# Patient Record
Sex: FEMALE | Race: BLACK OR AFRICAN AMERICAN | NOT HISPANIC OR LATINO | Employment: UNEMPLOYED | URBAN - METROPOLITAN AREA
[De-identification: names, ages, dates, MRNs, and addresses within clinical notes are randomized per-mention and may not be internally consistent; named-entity substitution may affect disease eponyms.]

---

## 2017-11-10 ENCOUNTER — HISTORICAL (OUTPATIENT)
Dept: RADIOLOGY | Facility: HOSPITAL | Age: 22
End: 2017-11-10

## 2018-10-26 ENCOUNTER — HISTORICAL (OUTPATIENT)
Dept: ADMINISTRATIVE | Facility: HOSPITAL | Age: 23
End: 2018-10-26

## 2019-02-21 ENCOUNTER — HISTORICAL (OUTPATIENT)
Dept: ADMINISTRATIVE | Facility: HOSPITAL | Age: 24
End: 2019-02-21

## 2019-02-23 LAB — FINAL CULTURE: NORMAL

## 2021-10-31 ENCOUNTER — HOSPITAL ENCOUNTER (OUTPATIENT)
Dept: MEDSURG UNIT | Facility: HOSPITAL | Age: 26
End: 2021-11-02
Attending: INTERNAL MEDICINE | Admitting: INTERNAL MEDICINE

## 2021-10-31 LAB
ABS NEUT (OLG): 5.25 X10(3)/MCL (ref 2.1–9.2)
ALBUMIN SERPL-MCNC: 4.5 GM/DL (ref 3.5–5)
ALBUMIN/GLOB SERPL: 1.2 RATIO (ref 1.1–2)
ALP SERPL-CCNC: 48 UNIT/L (ref 40–150)
ALT SERPL-CCNC: 23 UNIT/L (ref 0–55)
AMPHET UR QL SCN: NEGATIVE
APPEARANCE, UA: CLEAR
AST SERPL-CCNC: 25 UNIT/L (ref 5–34)
BACTERIA #/AREA URNS AUTO: ABNORMAL /HPF
BARBITURATE SCN PRESENT UR: NEGATIVE
BASOPHILS # BLD AUTO: 0 X10(3)/MCL (ref 0–0.2)
BASOPHILS NFR BLD AUTO: 0 %
BENZODIAZ UR QL SCN: POSITIVE
BILIRUB SERPL-MCNC: 0.2 MG/DL
BILIRUB UR QL STRIP: NEGATIVE
BILIRUBIN DIRECT+TOT PNL SERPL-MCNC: 0.1 MG/DL (ref 0–0.5)
BILIRUBIN DIRECT+TOT PNL SERPL-MCNC: 0.1 MG/DL (ref 0–0.8)
BUN SERPL-MCNC: 7.7 MG/DL (ref 7–18.7)
CALCIUM SERPL-MCNC: 10.4 MG/DL (ref 8.7–10.5)
CANNABINOIDS UR QL SCN: NEGATIVE
CHLORIDE SERPL-SCNC: 106 MMOL/L (ref 98–107)
CK MB SERPL-MCNC: 1.9 NG/ML
CK SERPL-CCNC: 507 U/L (ref 29–168)
CO2 SERPL-SCNC: 25 MMOL/L (ref 22–29)
COCAINE UR QL SCN: NEGATIVE
COLOR UR: COLORLESS
CREAT SERPL-MCNC: 0.75 MG/DL (ref 0.55–1.02)
ERYTHROCYTE [DISTWIDTH] IN BLOOD BY AUTOMATED COUNT: 12 % (ref 11.5–14.5)
ETHANOL SERPL-MCNC: 192 MG/DL
GLOBULIN SER-MCNC: 3.9 GM/DL (ref 2.4–3.5)
GLUCOSE (UA): NEGATIVE
GLUCOSE SERPL-MCNC: 88 MG/DL (ref 74–100)
HAV IGM SERPL QL IA: NONREACTIVE
HBV CORE IGM SERPL QL IA: NONREACTIVE
HBV SURFACE AG SERPL QL IA: NONREACTIVE
HCT VFR BLD AUTO: 38.4 % (ref 35–46)
HCV AB SERPL QL IA: NONREACTIVE
HGB BLD-MCNC: 12.5 GM/DL (ref 12–16)
HGB UR QL STRIP: 0.1
HIV 1+2 AB+HIV1 P24 AG SERPL QL IA: NONREACTIVE
HYALINE CASTS #/AREA URNS LPF: ABNORMAL /LPF
IMM GRANULOCYTES # BLD AUTO: 0.02 10*3/UL
IMM GRANULOCYTES NFR BLD AUTO: 0 %
KETONES UR QL STRIP: NEGATIVE
LEUKOCYTE ESTERASE UR QL STRIP: NEGATIVE
LYMPHOCYTES # BLD AUTO: 1.7 X10(3)/MCL (ref 0.6–4.6)
LYMPHOCYTES NFR BLD AUTO: 23 %
MAGNESIUM SERPL-MCNC: 1.9 MG/DL (ref 1.6–2.6)
MCH RBC QN AUTO: 33.6 PG (ref 26–34)
MCHC RBC AUTO-ENTMCNC: 32.6 GM/DL (ref 31–37)
MCV RBC AUTO: 103.2 FL (ref 80–100)
MONOCYTES # BLD AUTO: 0.6 X10(3)/MCL (ref 0.1–1.3)
MONOCYTES NFR BLD AUTO: 7 %
NEUTROPHILS # BLD AUTO: 5.25 X10(3)/MCL (ref 2.1–9.2)
NEUTROPHILS NFR BLD AUTO: 69 %
NITRITE UR QL STRIP: NEGATIVE
NRBC BLD AUTO-RTO: 0 % (ref 0–0.2)
OPIATES UR QL SCN: NEGATIVE
PCP UR QL: NEGATIVE
PH UR STRIP.AUTO: 6 [PH] (ref 3–11)
PH UR STRIP: 6 [PH] (ref 4.5–8)
PLATELET # BLD AUTO: 253 X10(3)/MCL (ref 130–400)
PMV BLD AUTO: 10 FL (ref 7.4–10.4)
POC BETA-HCG (QUAL): NEGATIVE
POTASSIUM SERPL-SCNC: 3.4 MMOL/L (ref 3.5–5.1)
PROT SERPL-MCNC: 8.4 GM/DL (ref 6.4–8.3)
PROT UR QL STRIP: 70 MG/DL
RBC # BLD AUTO: 3.72 X10(6)/MCL (ref 4–5.2)
RBC #/AREA URNS AUTO: ABNORMAL /HPF
SARS-COV-2 AG RESP QL IA.RAPID: NEGATIVE
SODIUM SERPL-SCNC: 142 MMOL/L (ref 136–145)
SP GR UR STRIP: 1 (ref 1–1.03)
SQUAMOUS #/AREA URNS LPF: ABNORMAL /LPF
T PALLIDUM AB SER QL: NONREACTIVE
T4 FREE SERPL-MCNC: 0.85 NG/DL (ref 0.7–1.48)
TROPONIN I SERPL-MCNC: 0.01 NG/ML (ref 0–0.04)
TSH SERPL-ACNC: 1.62 UIU/ML (ref 0.35–4.94)
UROBILINOGEN UR STRIP-ACNC: NORMAL
WBC # SPEC AUTO: 7.6 X10(3)/MCL (ref 4.5–11)
WBC #/AREA URNS AUTO: ABNORMAL /HPF

## 2021-11-01 LAB
ABS NEUT (OLG): 2.27 X10(3)/MCL (ref 2.1–9.2)
ALBUMIN SERPL-MCNC: 3.6 GM/DL (ref 3.5–5)
ALBUMIN/GLOB SERPL: 1.1 RATIO (ref 1.1–2)
ALP SERPL-CCNC: 39 UNIT/L (ref 40–150)
ALT SERPL-CCNC: 21 UNIT/L (ref 0–55)
AST SERPL-CCNC: 26 UNIT/L (ref 5–34)
BASOPHILS # BLD AUTO: 0 X10(3)/MCL (ref 0–0.2)
BASOPHILS NFR BLD AUTO: 0 %
BILIRUB SERPL-MCNC: 0.4 MG/DL
BILIRUBIN DIRECT+TOT PNL SERPL-MCNC: 0.2 MG/DL (ref 0–0.5)
BILIRUBIN DIRECT+TOT PNL SERPL-MCNC: 0.2 MG/DL (ref 0–0.8)
BUN SERPL-MCNC: 6 MG/DL (ref 7–18.7)
CALCIUM SERPL-MCNC: 9.2 MG/DL (ref 8.7–10.5)
CHLORIDE SERPL-SCNC: 105 MMOL/L (ref 98–107)
CO2 SERPL-SCNC: 24 MMOL/L (ref 22–29)
CREAT SERPL-MCNC: 0.54 MG/DL (ref 0.55–1.02)
ERYTHROCYTE [DISTWIDTH] IN BLOOD BY AUTOMATED COUNT: 12.1 % (ref 11.5–14.5)
FOLATE SERPL-MCNC: 8.7 NG/ML (ref 7–31.4)
GLOBULIN SER-MCNC: 3.3 GM/DL (ref 2.4–3.5)
GLUCOSE SERPL-MCNC: 80 MG/DL (ref 74–100)
HCT VFR BLD AUTO: 33.4 % (ref 35–46)
HGB BLD-MCNC: 10.9 GM/DL (ref 12–16)
IMM GRANULOCYTES # BLD AUTO: 0.01 10*3/UL
IMM GRANULOCYTES NFR BLD AUTO: 0 %
LYMPHOCYTES # BLD AUTO: 1.7 X10(3)/MCL (ref 0.6–4.6)
LYMPHOCYTES NFR BLD AUTO: 37 %
MAGNESIUM SERPL-MCNC: 1.9 MG/DL (ref 1.6–2.6)
MCH RBC QN AUTO: 33.7 PG (ref 26–34)
MCHC RBC AUTO-ENTMCNC: 32.6 GM/DL (ref 31–37)
MCV RBC AUTO: 103.4 FL (ref 80–100)
MONOCYTES # BLD AUTO: 0.6 X10(3)/MCL (ref 0.1–1.3)
MONOCYTES NFR BLD AUTO: 13 %
NEUTROPHILS # BLD AUTO: 2.27 X10(3)/MCL (ref 2.1–9.2)
NEUTROPHILS NFR BLD AUTO: 50 %
NRBC BLD AUTO-RTO: 0 % (ref 0–0.2)
PHOSPHATE SERPL-MCNC: 2.7 MG/DL (ref 2.3–4.7)
PLATELET # BLD AUTO: 205 X10(3)/MCL (ref 130–400)
PMV BLD AUTO: 10.2 FL (ref 7.4–10.4)
POTASSIUM SERPL-SCNC: 3.4 MMOL/L (ref 3.5–5.1)
PROT SERPL-MCNC: 6.9 GM/DL (ref 6.4–8.3)
RBC # BLD AUTO: 3.23 X10(6)/MCL (ref 4–5.2)
SODIUM SERPL-SCNC: 137 MMOL/L (ref 136–145)
VIT B12 SERPL-MCNC: 535 PG/ML (ref 213–816)
WBC # SPEC AUTO: 4.6 X10(3)/MCL (ref 4.5–11)

## 2021-11-02 LAB
ABS NEUT (OLG): 2.13 X10(3)/MCL (ref 2.1–9.2)
ALBUMIN SERPL-MCNC: 3.9 GM/DL (ref 3.5–5)
ALBUMIN/GLOB SERPL: 1 RATIO (ref 1.1–2)
ALP SERPL-CCNC: 45 UNIT/L (ref 40–150)
ALT SERPL-CCNC: 21 UNIT/L (ref 0–55)
AST SERPL-CCNC: 23 UNIT/L (ref 5–34)
BASOPHILS # BLD AUTO: 0 X10(3)/MCL (ref 0–0.2)
BASOPHILS NFR BLD AUTO: 0 %
BILIRUB SERPL-MCNC: 0.3 MG/DL
BILIRUBIN DIRECT+TOT PNL SERPL-MCNC: 0.1 MG/DL (ref 0–0.8)
BILIRUBIN DIRECT+TOT PNL SERPL-MCNC: 0.2 MG/DL (ref 0–0.5)
BUN SERPL-MCNC: 6.9 MG/DL (ref 7–18.7)
CALCIUM SERPL-MCNC: 9.9 MG/DL (ref 8.7–10.5)
CHLORIDE SERPL-SCNC: 105 MMOL/L (ref 98–107)
CO2 SERPL-SCNC: 22 MMOL/L (ref 22–29)
CREAT SERPL-MCNC: 0.71 MG/DL (ref 0.55–1.02)
EOSINOPHIL # BLD AUTO: 0 X10(3)/MCL (ref 0–0.9)
EOSINOPHIL NFR BLD AUTO: 0 %
ERYTHROCYTE [DISTWIDTH] IN BLOOD BY AUTOMATED COUNT: 11.8 % (ref 11.5–14.5)
GLOBULIN SER-MCNC: 3.9 GM/DL (ref 2.4–3.5)
GLUCOSE SERPL-MCNC: 104 MG/DL (ref 74–100)
HCT VFR BLD AUTO: 35.9 % (ref 35–46)
HGB BLD-MCNC: 12 GM/DL (ref 12–16)
IMM GRANULOCYTES # BLD AUTO: 0.01 10*3/UL
IMM GRANULOCYTES NFR BLD AUTO: 0 %
LYMPHOCYTES # BLD AUTO: 1.8 X10(3)/MCL (ref 0.6–4.6)
LYMPHOCYTES NFR BLD AUTO: 38 %
MAGNESIUM SERPL-MCNC: 2.1 MG/DL (ref 1.6–2.6)
MCH RBC QN AUTO: 33.4 PG (ref 26–34)
MCHC RBC AUTO-ENTMCNC: 33.4 GM/DL (ref 31–37)
MCV RBC AUTO: 100 FL (ref 80–100)
MONOCYTES # BLD AUTO: 0.6 X10(3)/MCL (ref 0.1–1.3)
MONOCYTES NFR BLD AUTO: 14 %
NEUTROPHILS # BLD AUTO: 2.13 X10(3)/MCL (ref 2.1–9.2)
NEUTROPHILS NFR BLD AUTO: 47 %
NRBC BLD AUTO-RTO: 0 % (ref 0–0.2)
PHOSPHATE SERPL-MCNC: 2.7 MG/DL (ref 2.3–4.7)
PLATELET # BLD AUTO: 234 X10(3)/MCL (ref 130–400)
PMV BLD AUTO: 10.4 FL (ref 7.4–10.4)
POTASSIUM SERPL-SCNC: 3.8 MMOL/L (ref 3.5–5.1)
PROT SERPL-MCNC: 7.8 GM/DL (ref 6.4–8.3)
RBC # BLD AUTO: 3.59 X10(6)/MCL (ref 4–5.2)
SODIUM SERPL-SCNC: 136 MMOL/L (ref 136–145)
WBC # SPEC AUTO: 4.5 X10(3)/MCL (ref 4.5–11)

## 2022-03-20 ENCOUNTER — HISTORICAL (OUTPATIENT)
Dept: ADMINISTRATIVE | Facility: HOSPITAL | Age: 27
End: 2022-03-20

## 2022-04-10 ENCOUNTER — HISTORICAL (OUTPATIENT)
Dept: ADMINISTRATIVE | Facility: HOSPITAL | Age: 27
End: 2022-04-10
Payer: MEDICAID

## 2022-04-29 VITALS
DIASTOLIC BLOOD PRESSURE: 87 MMHG | BODY MASS INDEX: 26.61 KG/M2 | SYSTOLIC BLOOD PRESSURE: 137 MMHG | HEIGHT: 67 IN | WEIGHT: 169.56 LBS

## 2022-05-02 NOTE — HISTORICAL [facility] CERNER
This is a historical note converted from [employer]. Formatting and pictures may have been removed.  Please reference [employer]ner for original formatting and attached multimedia. Admit and Discharge Dates  Admit Date: 10/31/2021  Discharge Date: 11/02/2021  Physicians  Attending Physician - Andrew PHILLIPS, Stewart  Admitting Physician - Son PHILLIPS, Berry SUGGS  Consulting Physician - Kam PHILLIPS, Winnie MITCHELL  Primary Care Physician - Rhonda PHILLIPS , Isabel BENNETT  Discharge Diagnosis  Altered mental status?[id number]  Muscle spasms of both upper and lower extremities?M62.838  Pseudoseizures?R56.9  Admission Information  This is a 26 year old  female with a past medical history of hyperthyroidism, anxiety, hair loss, and unspecified heart valve disease per mother who presented to [facility] ED on 10/31/21 for seizures. Patient states that she was at a party sitting down?when she laid her head on family member then?had sudden onset of generalized spasms of upper and lower extremities with grinding of the teeth. States that she recalls feeling a sensation of a rush of air through both ears and did not lose consciousness. No tongue biting, no incontinence.?Event was witnessed by family members.?Patients mother at bedside states that she has had prior episodes of seizure like activity in May 2021 and was admitted at [facility]; EEG was done during this admission but she was not started on anti epileptic medications at discharge. She had no further episodes of seizures until the night of consult. In the ambulance, she again had witnessed seizure activity and was given 5 mg Versed. On arrival to ED, patient had 2 further episodes of tonic clonic spasms with grinding of teeth; was given 2g of Ativan after each episode for a total of 4g, thus prompting consult to Internal Medicine. At the time of examination, patient again had seizure episode with generalized tonic clonic spasms of both upper and lower extremities and teeth  grinding. Also complained of chest pain at the time of this episode. She was?started on Keppra and admitted for observation.  Hospital Course  This is a 26 year old  female with a past medical history of hyperthyroidism, anxiety, hair loss, and unspecified heart valve disease per mother who presented to [facility] ED on 10/31/21 for seizures. On arrival to ED, patient had 2 further episodes of tonic clonic spasms with grinding of teeth; was given 2g of Ativan after each episode for a total of 4g, thus prompting consult to Internal Medicine. She was?started on Keppra and admitted for observation. On hospital day 1 patients potassium was repleted.? An echocardiogram was ordered due to a unspecified valvular heart disease.? TFTs were ordered given history of hyperthyroidism.? Patient was put on seizure precautions, made n.p.o. and scheduled for acute 2-hour neurochecks.? Patient was started on Keppra 1000 twice daily for loading followed by 500 twice daily daily.? Patient was prescribed Ativan to be given for seizure-like activity.? Neurology records were requested from [facility].? On hospital day 2 patient had seizure-like activity overnight which resolved after 1 dose of Ativan.? Patients Keppra was increased to 750 mg twice daily.? Patient had further one episode of seizure-like activity during the day which resolved after 1 mg of Ativan.? Video was obtained which was seen by neurology.? Neurology stated that seizure-like activity was psychogenic in nature and not a true seizure.? Patients Keppra was decreased back to to 500 mg twice a day and patient was started on Lamictal 50 mg daily.? On hospital day 3 patient had no further episodes of seizure-like activity.? Patients TFTs were normal.? Keppra was discontinued per neurologys recommendations and patient was discharged home on Lamictal 50 mg daily.? Patient was scheduled for post wards follow-up and given a referral to neurology as patient does not have  outside neurologist [facility].  Significant Findings  Pseudoseizures  Time Spent on discharge  Greater than 35 mins  Objective  Vitals & Measurements  T:?36.9? ?C (Oral)? TMIN:?36.6? ?C (Oral)? TMAX:?37? ?C (Oral)? HR:?77(Peripheral)? RR:?20? BP:?133/82? SpO2:?97%? WT:?84.6?kg?  Physical Exam  See progress note  Patient Discharge Condition  Patient is clinically and hemodynamically stable  Discharge Disposition  Discharge home  Rx Lamictal 50mg daily  Discontinued Keppra 500mg BID per neurology recommendations  Follow up with [facility] on 11/22/21 with [facility]  Follow up with [facility] at next available appointment  Return to the ED if symptoms worsen or return, patient states verbal understanding and agrees   Discharge Medication Reconciliation  Prescribed  lamoTRIgine (LaMICtal 25 mg oral tablet)?50 mg, Oral, Daily  Continue  methIMAzole (methimazole 10 mg oral tablet)?10 mg, Oral, TID  propranolol (propranolol 10 mg oral tablet)?10 mg, Oral, TID  Discontinue  propranolol (propranolol 20 mg oral tablet)?20 mg, Oral, TID  Education and Orders Provided  Seizure, Adult, Easy-to-Read  Living With Anxiety  Discharge - 11/02/21 14:21:00 CDT, Home, please do not discharge until meds to bed is complete?  Follow up  Report to Emergency Department if symptoms return or worsen  Follow up with [facility] on 11/22/21  Follow up with [facility] Neurology clinic at next available appointment

## 2022-05-02 NOTE — HISTORICAL [facility] CERNER
This is a historical note converted from Cerner. Formatting and pictures may have been removed.  Please reference Cerner for original formatting and attached multimedia. Chief Complaint  To ED per EMS. ?States witnesed seizure per Parents. ?Parents state not normal seizure. ?States was grinding teeth and very stiff. ?Same witnessed upon arrival to ED 12.  History of Present Illness  This is a 26 year old  female with a past medical history of hyperthyroidism, anxiety, hair loss, and unspecified heart valve disease per mother who presented to [facility] ED on 10/31/21 for seizures. Patient states that she was at a party sitting down?when she laid her head on family member then?had sudden onset of generalized spasms of upper and lower extremities with grinding of the teeth. States that she recalls feeling a sensation of a rush of air through both ears and did not lose consciousness. No tongue biting, no incontinence.?Event was witnessed by family members.?Patients mother at bedside states that she has had prior episodes of seizure like activity in May 2021 and was admitted at [facility]; EEG was done during this admission but she was not started on anti epileptic medications at discharge. She had no further episodes of seizures until the night of consult. In the ambulance, she again had witnessed seizure activity and was given 5 mg Versed. On arrival to ED, patient had 2 further episodes of tonic clonic spasms with grinding of teeth; was given 2g of Ativan after each episode for a total of 4g, thus prompting consult to Internal Medicine. At the time of examination, patient again had seizure episode with generalized tonic clonic spasms of both upper and lower extremities and teeth grinding. Also complained of chest pain at the time of this episode. She was?started on Keppra and admitted for observation  ?  Past Medical History: Hyperthyroidism/ hypothyroidism; states that she is supposed to take methimazole and  propanolol but stopped taking Methimazole recently, stating that it causes her to have hallucinations; currently taking thyroid supplements over the counter from [employer]; anxiety, hair loss,?unspecified heart valve disease  Past Surgical History: lap cholecystectomy  Social History: nonsmoker, states that she occasionally has 1 alcoholic beverage, no drug use  Review of Systems  10 point review of systems unremarkable except as listed above  Physical Exam  Vitals & Measurements  T:?36.0? ?C (Temporal Artery)? HR:?107(Peripheral)? RR:?18? BP:?132/90? SpO2:?98%?  General:?well-developed well-nourished in no acute distress  Eye: no scleral icterus, EOMI  HENT:?normocephalic, atraumatic  Neck: full range of motion, no thyromegaly or lymphadenopathy  Respiratory:?clear to auscultation bilaterally  Cardiovascular:?regular rate and rhythm without murmurs, gallops or rubs; no peripheral edema  Gastrointestinal:?soft, mildly tender epigastric region, non-distended with normal bowel sounds, without masses to palpation  Genitourinary: no CVA tenderness to palpation  Musculoskeletal:?full range of motion of all extremities/spine without limitation or discomfort  Integumentary: no rashes or skin lesions present  Neurologic:?  ?   Neuro Exam: ?  MENTAL STATUS: AAOx3, tremulous,?teeth grinding and jerking movements of hands during exam  ? LANG/SPEECH: Fluent,?  ? CRANIAL NERVES:  ? II: Pupils equal and reactive, no RAPD, normal visual field and fundus  ? III, IV, VI: EOM intact, no gaze preference or deviation  ? V: normal  ? VII: no facial asymmetry  ? VIII: normal hearing to speech  ? MOTOR:?4/5 in?right upper and lower extremity; 5/5 in left upper and lower extremity  did not complete cerebellar/ reflex testing due to onset of seizure activity during exam  Assessment/Plan  Seizure disorder  -Admitted to remote telemetry for observation for seizure activity  -NPO  -UDS positive for benzodiazpines  -Consult case management to  obtain outside records for previous hospitalization in [facility] for seizure like activity  -Started on keppra IV and Ativan q2 prn seizures  ?   Hypokalemia  -Potassium of 3.4 on admission  -ordered 20 mEq KCl  ?   Hyperthyroidism  -holding thyroid medications while NPO, patient not currently taking methimazole as prescribed  ?   Unspecified valvular heart disease  -states that she has was diagnosed with unrecalled valve disease  -Ordered echocardiogram  -complained of chest pain during seizure episode in ED, ordered troponin, CK, CK-MB  ?   DVT Proph: Lovenox  GI proph: Protonix  IVF: none  Abx none  Diet: NPO  ?  PGY 2 addendum:  ?  Agree with above documentation  ?  Patient is a 26-year-old -American female with PMH of anxiety, hemorrhoids, unspecified valvular heart disease (follows outside cardiologist every 6 months), thyroid disease (patient and mother states she alternates between hyper and hypothyroidism but no longer takes methimazole and instead takes OTC thyroid supplement from [employer]-follows with apparent endocrinologist in [place] however has not seen in some time due to unreliable transport), alopecia who presented to the ED after witnessed seizure-like activity while she was at a [email or web address] party and consuming alcohol.? States that she laid her head on a family member while sitting down and started convulsing with bilateral upper extremity spasms/contractions associated with teeth grinding, blurry vision, headache, nausea, and vague nonspecific chest pain.? Denies any tongue biting or loss of bowel/bladder function.? She does state that she was previously admitted to [facility] for a 1 week hospital course where they did an EEG which she states was unremarkable and she was not discharged on any antiepileptic medication.? We do not have access to these outside records.? Consulted case management for assistance.? She had another such seizure-like episode while in the CT scanner and when we  were evaluating her in the ED.  ?  In the ED she has remained tachycardic, afebrile, normotensive, on room air.? She has received 6 mg of Ativan in total, 5 mg of intranasal Versed, and 2 L of fluids.? Lab work significant for potassium 3.4, ethanol level 192, UDS positive for benzos.? CT head preliminary read with no acute intracranial process.? Small retention cyst or polyp is seen in the right maxillary sinus.? On exam she is well-developed well-nourished normocephalic atraumatic PERRLA EOMI MMM tachycardic regular rhythm CTAB no obvious MGR no edema normal peripheral pulses abdomen with mild epigastric tenderness no distention normal bowel sounds.? No FND however patient appears to have decreased right upper extremity strength and slightly reduced right  strength.? However she started having her seizure-like activity during neurological assessment therefore unable to do full assessment.  ?  Seizure disorder  -There is concern for pseudoseizure and muscle spasms  -N.p.o., seizure precautions, every 2 hour neurochecks  -Started Keppra 1000 mg IV twice daily; no previous antiepileptics prescribed  -Ativan as needed 1 g every 2 hours as needed for seizure-like activity  -Consulted case management to request records from [facility] admission pertaining to seizures and EEG performed  -Consider prolactin level at next seizure-like activity  -Consider EEG and/or MRI brain  -Ethanol level 192  ?  Hypokalemia  -Potassium 3.4  -Repleting with 20 mEq IV  ?  Unspecified valvular heart disease  -Ordered echocardiogram  -Mother and patient state that she follows outside cardiologist every 6 months  ?  Thyroid disorder  Alopecia  -Previous history of hyper thyroidism/goiter which went to hypothyroidism  -No longer on methimazole and instead takes OTC thyroid supplement from [employer]  -Pending TFTs  ?  ?  =======  Primary team addendum  =======  ?  Ms. Culver is a 26-year-old -American female with a past medical history  of hyperthyroidism, anxiety, trichotillomania, hemorrhoids,?and unspecified heart valve disease who presents to [facility] ED with her mother for seizures. ?Per the patient and mother, patient was attending a Halloween party yesterday evening and was feeling fine until she sat down when she started convulsing with bilateral upper extremity spasms and/or contractions with grinding of teeth and foaming at the mouth. ?Patient states that she has had episodes similar to this in the past and describes a prodromal feeling of being underwater or having a pounding in her ears before onset of seizure. ?Note, patient states that she is completely conscious during episodes and is without tongue biting or incontinence. ?  Patient endorses being hospitalized in May of this year at our [facility] for similar episode where a 24-hour EEG was performed by Dr. Benitez. ?Upon discharge patient was not prescribed any antiseizure medications.  ?  When examining patient with night team patient began to have what appeared to be a seizure with spasm/contraction of upper extremity with grinding of teeth and foaming at mouth. ?Patient was given 2 g of Ativan and admitted for observation.  ?  Past Medical History: Hyperthyroidism on methimazole and propranolol, anxiety, hair loss, unspecified heart valve disease  Past Surgical History: cholecystectomy  Social History: nonsmoker, states that she occasionally has 1 alcoholic beverage, no drug use  ?   Constitutional: No recent changes in?weight or appetite. No fevers, no chills, no?recent illness or sick contacts  Head:?No headache, no neck pain  Eyes: Blurry vision +, No double vision  Ears: No hearing loss, no tinnitus  Nose/mouth: No rhinorrhea, no congestion, no sore throat  Cardiac: Chest pain +, no palpitations, no lower extremity edema  Resp: No shortness of breath, no dyspnea on exertion,?no coughing  GI: No abdominal pain, no constipation, no diarrhea, no nausea, no emesis  Gu: No  dysuria, no hematuria, no incontinence  Musculoskeletal: No muscle weakness, no joint pain. Ambulatory without assistance at baseline  Neurologic: No loss of sensation, no dizziness,?no syncope  Skin: No rash, no jaundice, no sores  Psychiatric:?anxiety + , no SI/HI or depression  ?   Gen: Anxious appearing  female laying in bed in moderate distress  HEENT: Normocephalic, atraumatic.  Neck: No JVD or carotid bruits. No thyromegaly. No lymphadenopathy.  Heart: Tachycardic, no murmurs, gallops, clicks or rubs.  Lungs: CTAB without rales, wheezes or rhonchi. Normal work of breathing. Chest rise symmetrical on inspiration.  Abd: Soft, non-tender, non-distended and without guarding. No organomegaly. No obvious masses. Bowel sounds present.  Extremities: Radial and pedal pulses 2+ bilaterally, no LE edema.  MSK: No obvious deformities. Moves all extremities purposefully.  Neuro: Responds well to commands. alert to person, place, and time.  Skin: Warm, dry and without rashes.  ?  Seizure disorder  -Previous 24 EEG preformed at outside facility  -N.p.o., seizure precautions, every 2 hour neurochecks  -Started Keppra 1000 mg IV BID for loading, followed by 500BID starting tonight  -Ativan as needed 1 g every 2 hours as needed for seizure-like activity  -Consulted case management to request records from [facility] admission pertaining to seizures and EEG performed  -consulting neurology?pending records from?[facility]  -Consider prolactin level at next seizure-like activity  -Ethanol level 192  ?   Hypokalemia  -Potassium 3.4  -Repleting with 20 mEq IV  ?   Unspecified valvular heart disease  -Ordered echocardiogram  -Mother and patient state that she follows outside cardiologist every 6 months  ?   Thyroid disorder  Alopecia  -Previous history of hyper thyroidism/goiter which went to hypothyroidism  -No longer on methimazole and instead takes OTC thyroid supplement from [employer]  -Free T4 .85, TSH 1.61  - TRAB  and TSI pending  -Suspect?other autoimmune process?pending?lupus work-up  ?  Tachycardia  - Blood pressure sustaining in 120s  - On LR  ?  Disposition: Admit patient for observation.??N.p.o., seizure precautions?and neuro checks every 2 hours. ?Patient started on?1000 mg?of Keppra?twice daily?with?as needed Ativan?every 2 hours?for seizure-like activity. ?Will consider?consulting neurology. ?Pending?outside medical records from [facility]?pertaining to?seizure?history.? Pending echocardiogram for unspecified valvular heart disease.? Pending TRAB/TSI. Pending lupus workup  ?  Reji Crandall, DO  [employer] IM PGY 1  ?  Pt obs by on call team.? I saw the patient with the residents on rounds 10/31/21?and discussed the case, assisted with the development of the assessment and agree with the plan of care in as much as pt previously dx w/ pseudoseizures, reaadmitted with sz activity and tx with comorbid heavy etoh use by hx, hypokalemia, valvular heart disease by hx, hyperthyroidism s/p SIMS per mom likely Graves, alopecia s/p biopsy, tachycardia, proteinuria likely >500mg per chart review.? Needs neuro consult in the am, SLE workup.? Currently euthyroid per labs.? 50 minutes or more were spent in pt care.  Berry Cline MD, FACE   Problem List/Past Medical History  Ongoing  Obesity  Historical  Anxiety  biopsy  Hyperthyroidism  Hypothyroidism  Tachycardia  Procedure/Surgical History  Cholecystectomy Laparoscopic (.) (12/27/2016)  Laparoscopy, surgical; cholecystectomy (12/25/2016)  Resection of Gallbladder, Percutaneous Endoscopic Approach (12/25/2016)  Biopsy  Cholecystectomy  Extraction of wisdom tooth   Medications  Inpatient  Fluor-I-Strip-A.T., 1 mg= 1 EA, OPTH, Once  Keppra 500 mg/ mL PREMIX, 1000 mg= 200 mL, IV Piggyback, BID  ketorolac 30 mg for IV Push, 30 mg= 1 mL, IV Push, Once  Lactated Ringers 1000ml 1,000 mL, 1000 mL, IV  Lovenox, 40 mg= 0.4 mL, Subcutaneous, Daily  Norco 10 mg-325 mg oral tablet, 1  tab(s), Oral, Once  Protonix, 40 mg= 1 EA, IV Slow, Daily  tetracaine 0.5% ophthalmic solution, 1 drop(s), Eye-Left, Once  Zofran, 4 mg= 2 mL, IV Push, q4hr, PRN  Home  methimazole 10 mg oral tablet, 10 mg= 1 tab(s), Oral, TID, 2 refills,? ?Not Taking, Completed Rx  propranolol 10 mg oral tablet, 10 mg= 1 tab(s), Oral, TID  propranolol 20 mg oral tablet, 20 mg= 1 tab(s), Oral, TID  Allergies  No Known Medication Allergies  Tomatoes?(swelling)  Social History  Abuse/Neglect  No, 10/31/2021  Alcohol - Denies Alcohol Use, 03/13/2014  Current, Beer, Wine, Liquor, 10/31/2021  Employment/School  Part time, Work/School description: [employer]. Highest education level: High school., 06/16/2015  Exercise  Exercise frequency: 1-2 times/week. Self assessment: Fair condition. Exercise type: Running., 06/16/2015  Home/Environment  Lives with Father, Mother, Siblings. Living situation: Home/Independent. Alcohol abuse in household: No. Substance abuse in household: No. Smoker in household: No. Injuries/Abuse/Neglect in household: No. Feels unsafe at home: No. Safe place to go: Yes. Family/Friends available for support: Yes. Concern for family members at home: No. Major illness in household: Yes. Financial concerns: No., 06/16/2015  Nutrition/Health  eating mainly fruits, Wants to lose weight: No. Sleeping concerns: No. Feels highly stressed: No., 06/16/2015  Other  Substance Use  Never, 06/16/2015  Tobacco - Denies Tobacco Use, 03/13/2014  Never (less than 100 in lifetime), No, 10/31/2021  Family History  Migraine: Mother.  TONSIL & ADENOID PROCEDURES: Brother.  Tubes: Brother.  Immunizations  Vaccine Date Status Comments   influenza virus vaccine, inactivated 12/27/2016 Given Nursing Judgment   Lab Results  Labs Last 24 Hours?  ?Chemistry? Hematology/Coagulation?   Sodium Lvl: 142 mmol/L (10/31/21 04:05:00) WBC: 7.6 x10(3)/mcL (10/31/21 04:05:00)   Potassium Lvl:?3.4 mmol/L?Low (10/31/21 04:05:00) RBC:?3.72 x10(6)/mcL?Low  (10/31/21 04:05:00)   Chloride: 106 mmol/L (10/31/21 04:05:00) Hgb: 12.5 gm/dL (10/31/21 04:05:00)   CO2: 25 mmol/L (10/31/21 04:05:00) Hct: 38.4 % (10/31/21 04:05:00)   Calcium Lvl: 10.4 mg/dL (10/31/21 04:05:00) Platelet: 253 x10(3)/mcL (10/31/21 04:05:00)   Magnesium Lvl: 1.9 mg/dL (10/31/21 04:05:00) MCV:?103.2 fL?High (10/31/21 04:05:00)   Glucose Lvl: 88 mg/dL (10/31/21 04:05:00) MCH: 33.6 pg (10/31/21 04:05:00)   BUN: 7.7 mg/dL (10/31/21 04:05:00) MCHC: 32.6 gm/dL (10/31/21 04:05:00)   Creatinine: 0.75 mg/dL (10/31/21 04:05:00) RDW: 12 % (10/31/21 04:05:00)   eGFR-AA: >105 (10/31/21 04:05:00) MPV: 10 fL (10/31/21 04:05:00)   eGFR-TUAN: 101 mL/min/1.73 m2 (10/31/21 04:05:00) Abs Neut: 5.25 x10(3)/mcL (10/31/21 04:05:00)   Bili Total: 0.2 mg/dL (10/31/21 04:05:00) Neutro Auto: 69 % (10/31/21 04:05:00)   Bili Direct: 0.1 mg/dL (10/31/21 04:05:00) Lymph Auto: 23 % (10/31/21 04:05:00)   Bili Indirect: 0.1 mg/dL (10/31/21 04:05:00) Mono Auto: 7 % (10/31/21 04:05:00)   AST: 25 unit/L (10/31/21 04:05:00) Basophil Auto: 0 % (10/31/21 04:05:00)   ALT: 23 unit/L (10/31/21 04:05:00) Abs Neutro: 5.25 x10(3)/mcL (10/31/21 04:05:00)   Alk Phos: 48 unit/L (10/31/21 04:05:00) Abs Lymph: 1.7 x10(3)/mcL (10/31/21 04:05:00)   Total Protein:?8.4 gm/dL?High (10/31/21 04:05:00) Abs Mono: 0.6 x10(3)/mcL (10/31/21 04:05:00)   Albumin Lvl: 4.5 gm/dL (10/31/21 04:05:00) Abs Baso: 0 x10(3)/mcL (10/31/21 04:05:00)   Globulin:?3.9 gm/dL?High (10/31/21 04:05:00) NRBC%: 0.0 (10/31/21 04:05:00)   A/G Ratio: 1.2 ratio (10/31/21 04:05:00) IG%: 0 % (10/31/21 04:05:00)   T4 Free: 0.85 ng/dL (10/31/21 04:00:00) IG#: 0.02 (10/31/21 04:05:00)   TSH: 1.6169 uIU/mL (10/31/21 04:00:00)    U pH: 6 (10/31/21 03:37:00)

## 2022-07-26 ENCOUNTER — OFFICE VISIT (OUTPATIENT)
Dept: URGENT CARE | Facility: CLINIC | Age: 27
End: 2022-07-26
Payer: MEDICAID

## 2022-07-26 VITALS
RESPIRATION RATE: 18 BRPM | WEIGHT: 163.13 LBS | DIASTOLIC BLOOD PRESSURE: 88 MMHG | BODY MASS INDEX: 25.6 KG/M2 | SYSTOLIC BLOOD PRESSURE: 132 MMHG | TEMPERATURE: 99 F | OXYGEN SATURATION: 99 % | HEIGHT: 67 IN | HEART RATE: 98 BPM

## 2022-07-26 DIAGNOSIS — Z11.52 ENCOUNTER FOR SCREENING FOR COVID-19: Primary | ICD-10-CM

## 2022-07-26 LAB — SARS-COV-2 RNA RESP QL NAA+PROBE: DETECTED

## 2022-07-26 PROCEDURE — 99213 PR OFFICE/OUTPT VISIT, EST, LEVL III, 20-29 MIN: ICD-10-PCS | Mod: S$PBB,,,

## 2022-07-26 PROCEDURE — 99214 OFFICE O/P EST MOD 30 MIN: CPT | Mod: PBBFAC

## 2022-07-26 PROCEDURE — 87635 SARS-COV-2 COVID-19 AMP PRB: CPT

## 2022-07-26 PROCEDURE — 99213 OFFICE O/P EST LOW 20 MIN: CPT | Mod: S$PBB,,,

## 2022-07-26 RX ORDER — LAMOTRIGINE 25 MG/1
50 TABLET ORAL DAILY
Status: ON HOLD | COMMUNITY
Start: 2022-03-23 | End: 2023-05-08 | Stop reason: SDUPTHER

## 2022-07-26 RX ORDER — FLUTICASONE PROPIONATE 50 MCG
1 SPRAY, SUSPENSION (ML) NASAL DAILY
Qty: 9.9 ML | Refills: 0 | Status: ON HOLD | OUTPATIENT
Start: 2022-07-26 | End: 2023-05-07

## 2022-07-26 RX ORDER — PROPRANOLOL HYDROCHLORIDE 10 MG/1
10 TABLET ORAL 3 TIMES DAILY
COMMUNITY
Start: 2022-03-23

## 2022-07-26 RX ORDER — CETIRIZINE HYDROCHLORIDE 10 MG/1
10 TABLET ORAL DAILY
Qty: 30 TABLET | Refills: 0 | Status: SHIPPED | OUTPATIENT
Start: 2022-07-26 | End: 2022-08-25

## 2022-07-26 RX ORDER — LEVETIRACETAM 500 MG/1
500 TABLET ORAL 2 TIMES DAILY
COMMUNITY
Start: 2022-03-23 | End: 2022-11-14 | Stop reason: SDUPTHER

## 2022-07-27 ENCOUNTER — TELEPHONE (OUTPATIENT)
Dept: URGENT CARE | Facility: CLINIC | Age: 27
End: 2022-07-27
Payer: MEDICAID

## 2022-07-27 NOTE — TELEPHONE ENCOUNTER
----- Message from RUDOLPH [facility] sent at 7/27/2022  2:23 PM CDT -----  Please notify that covid19 PCR was positive.  Patient will need to be in quarantine for 5 days since the beginning of symptoms, must see an improvement of symptoms, 24 hours fever free before breaking quarantine.  If any wheezing, difficulty breathing or new symptoms then immediately to the ER.

## 2022-07-27 NOTE — PROGRESS NOTES
"Subjective:       Patient ID: Lesly Culver is a 27 y.o. female.    Vitals:  height is 5' 7" (1.702 m) and weight is 74 kg (163 lb 1.6 oz). Her temperature is 98.9 °F (37.2 °C). Her blood pressure is 132/88 and her pulse is 98. Her respiration is 18 and oxygen saturation is 99%.     Chief Complaint: COVID-19 Concerns (Exposure  )    PT here to be COVID tested due to being exposed to mother who is COVID positive. Pt currently asymptomatic. Denies cough, congestion, fever or N/V/D.      Constitution: Negative.   HENT: Negative.    Neck: neck negative.   Cardiovascular: Negative.    Eyes: Negative.    Respiratory: Negative.    Gastrointestinal: Negative.    Endocrine: negative.   Genitourinary: Negative.    Musculoskeletal: Negative.    Skin: Negative.        Objective:      Physical Exam   Constitutional: normal  HENT:   Head: Normocephalic.   Ears:   Right Ear: Tympanic membrane and ear canal normal.   Left Ear: Tympanic membrane and ear canal normal.   Nose: Nose normal.   Mouth/Throat: Uvula is midline, oropharynx is clear and moist and mucous membranes are normal. Oropharynx is clear.   Eyes: Pupils are equal, round, and reactive to light.   Cardiovascular: Normal rate, regular rhythm, normal heart sounds and normal pulses.   Pulmonary/Chest: Effort normal.   Abdominal: Normal appearance. Soft.   Musculoskeletal: Normal range of motion.         General: Normal range of motion.   Neurological: She is alert.   Skin: Skin is warm and dry.   Psychiatric: Mood normal.   Vitals reviewed.        Assessment:       1. Encounter for screening for COVID-19          Plan:         Encounter for screening for COVID-19  -     Cancel: POCT COVID-19 Rapid Screening  -     COVID-19 Routine Screening        COVID is contagious for 8-10 days from day 1 of symptoms in most people.  If you have to care for your family members, wear your N95/KN95 plus a surgical mask over it and wash your hands. Place both masks on before entering their " homes, and leave them on until you get in your car to leave. After 10 day from day 1 of symptom onset, you are no longer contagious.  Retesting is not recommended or needed as a PCR test can stay positive for 3 months after acute illness.    Please see provided patient education for guidance.    Go to the ER if you experience chest pain with SOB, SOBOE, high fevers 103+, excessive vomiting/diarrhea, or general distress.

## 2022-11-14 ENCOUNTER — HOSPITAL ENCOUNTER (EMERGENCY)
Facility: HOSPITAL | Age: 27
Discharge: HOME | End: 2022-11-14
Attending: FAMILY MEDICINE
Payer: MEDICAID

## 2022-11-14 ENCOUNTER — HOSPITAL ENCOUNTER (EMERGENCY)
Facility: HOSPITAL | Age: 27
Discharge: HOME | End: 2022-11-14
Attending: INTERNAL MEDICINE
Payer: MEDICAID

## 2022-11-14 VITALS
TEMPERATURE: 98 F | HEART RATE: 98 BPM | SYSTOLIC BLOOD PRESSURE: 139 MMHG | DIASTOLIC BLOOD PRESSURE: 97 MMHG | OXYGEN SATURATION: 98 % | RESPIRATION RATE: 19 BRPM | BODY MASS INDEX: 26.58 KG/M2 | WEIGHT: 165.38 LBS | HEIGHT: 66 IN

## 2022-11-14 VITALS
TEMPERATURE: 98 F | DIASTOLIC BLOOD PRESSURE: 81 MMHG | RESPIRATION RATE: 16 BRPM | HEART RATE: 99 BPM | SYSTOLIC BLOOD PRESSURE: 114 MMHG | OXYGEN SATURATION: 95 %

## 2022-11-14 DIAGNOSIS — R56.9 SEIZURE: ICD-10-CM

## 2022-11-14 DIAGNOSIS — F10.10 ALCOHOL ABUSE: Primary | ICD-10-CM

## 2022-11-14 DIAGNOSIS — G40.909 SEIZURE DISORDER: Primary | ICD-10-CM

## 2022-11-14 DIAGNOSIS — G40.909 SEIZURE DISORDER: ICD-10-CM

## 2022-11-14 LAB
ALBUMIN SERPL-MCNC: 4.3 GM/DL (ref 3.5–5)
ALBUMIN SERPL-MCNC: 4.5 GM/DL (ref 3.5–5)
ALBUMIN/GLOB SERPL: 1.3 RATIO (ref 1.1–2)
ALBUMIN/GLOB SERPL: 1.4 RATIO (ref 1.1–2)
ALP SERPL-CCNC: 39 UNIT/L (ref 40–150)
ALP SERPL-CCNC: 39 UNIT/L (ref 40–150)
ALT SERPL-CCNC: 25 UNIT/L (ref 0–55)
ALT SERPL-CCNC: 27 UNIT/L (ref 0–55)
AST SERPL-CCNC: 21 UNIT/L (ref 5–34)
AST SERPL-CCNC: 21 UNIT/L (ref 5–34)
B-HCG SERPL QL: NEGATIVE
BASOPHILS # BLD AUTO: 0.02 X10(3)/MCL (ref 0–0.2)
BASOPHILS NFR BLD AUTO: 0.5 %
BILIRUBIN DIRECT+TOT PNL SERPL-MCNC: 0.2 MG/DL
BILIRUBIN DIRECT+TOT PNL SERPL-MCNC: 0.3 MG/DL
BUN SERPL-MCNC: 7.5 MG/DL (ref 7–18.7)
BUN SERPL-MCNC: 9.1 MG/DL (ref 7–18.7)
CALCIUM SERPL-MCNC: 10.1 MG/DL (ref 8.4–10.2)
CALCIUM SERPL-MCNC: 9.5 MG/DL (ref 8.4–10.2)
CHLORIDE SERPL-SCNC: 105 MMOL/L (ref 98–107)
CHLORIDE SERPL-SCNC: 107 MMOL/L (ref 98–107)
CO2 SERPL-SCNC: 22 MMOL/L (ref 22–29)
CO2 SERPL-SCNC: 24 MMOL/L (ref 22–29)
CREAT SERPL-MCNC: 0.6 MG/DL (ref 0.55–1.02)
CREAT SERPL-MCNC: 0.64 MG/DL (ref 0.55–1.02)
EOSINOPHIL # BLD AUTO: 0 X10(3)/MCL (ref 0–0.9)
EOSINOPHIL NFR BLD AUTO: 0 %
ERYTHROCYTE [DISTWIDTH] IN BLOOD BY AUTOMATED COUNT: 12.6 % (ref 11.5–17)
ETHANOL SERPL-MCNC: 196 MG/DL
FLUAV AG UPPER RESP QL IA.RAPID: NOT DETECTED
FLUBV AG UPPER RESP QL IA.RAPID: NOT DETECTED
GFR SERPLBLD CREATININE-BSD FMLA CKD-EPI: >60 MLS/MIN/1.73/M2
GFR SERPLBLD CREATININE-BSD FMLA CKD-EPI: >60 MLS/MIN/1.73/M2
GLOBULIN SER-MCNC: 3.2 GM/DL (ref 2.4–3.5)
GLOBULIN SER-MCNC: 3.4 GM/DL (ref 2.4–3.5)
GLUCOSE SERPL-MCNC: 102 MG/DL (ref 74–100)
GLUCOSE SERPL-MCNC: 88 MG/DL (ref 74–100)
HCT VFR BLD AUTO: 34.7 % (ref 37–47)
HGB BLD-MCNC: 11.7 GM/DL (ref 12–16)
IMM GRANULOCYTES # BLD AUTO: 0.01 X10(3)/MCL (ref 0–0.04)
IMM GRANULOCYTES NFR BLD AUTO: 0.3 %
LYMPHOCYTES # BLD AUTO: 1.79 X10(3)/MCL (ref 0.6–4.6)
LYMPHOCYTES NFR BLD AUTO: 45.1 %
MAGNESIUM SERPL-MCNC: 1.8 MG/DL (ref 1.6–2.6)
MCH RBC QN AUTO: 33.8 PG (ref 27–31)
MCHC RBC AUTO-ENTMCNC: 33.7 MG/DL (ref 33–36)
MCV RBC AUTO: 100.3 FL (ref 80–94)
MONOCYTES # BLD AUTO: 0.29 X10(3)/MCL (ref 0.1–1.3)
MONOCYTES NFR BLD AUTO: 7.3 %
NEUTROPHILS # BLD AUTO: 1.9 X10(3)/MCL (ref 2.1–9.2)
NEUTROPHILS NFR BLD AUTO: 46.8 %
NRBC BLD AUTO-RTO: 0 %
PLATELET # BLD AUTO: 256 X10(3)/MCL (ref 130–400)
PMV BLD AUTO: 9.9 FL (ref 7.4–10.4)
POTASSIUM SERPL-SCNC: 3.8 MMOL/L (ref 3.5–5.1)
POTASSIUM SERPL-SCNC: 3.8 MMOL/L (ref 3.5–5.1)
PROT SERPL-MCNC: 7.7 GM/DL (ref 6.4–8.3)
PROT SERPL-MCNC: 7.7 GM/DL (ref 6.4–8.3)
RBC # BLD AUTO: 3.46 X10(6)/MCL (ref 4.2–5.4)
SARS-COV-2 RNA RESP QL NAA+PROBE: NOT DETECTED
SODIUM SERPL-SCNC: 140 MMOL/L (ref 136–145)
SODIUM SERPL-SCNC: 143 MMOL/L (ref 136–145)
TROPONIN I SERPL-MCNC: <0.01 NG/ML (ref 0–0.04)
WBC # SPEC AUTO: 4 X10(3)/MCL (ref 4.5–11.5)

## 2022-11-14 PROCEDURE — 96374 THER/PROPH/DIAG INJ IV PUSH: CPT

## 2022-11-14 PROCEDURE — 84484 ASSAY OF TROPONIN QUANT: CPT | Performed by: FAMILY MEDICINE

## 2022-11-14 PROCEDURE — 99291 CRITICAL CARE FIRST HOUR: CPT

## 2022-11-14 PROCEDURE — 84703 CHORIONIC GONADOTROPIN ASSAY: CPT | Performed by: FAMILY MEDICINE

## 2022-11-14 PROCEDURE — 83735 ASSAY OF MAGNESIUM: CPT | Performed by: FAMILY MEDICINE

## 2022-11-14 PROCEDURE — 0240U COVID/FLU A&B PCR: CPT | Performed by: FAMILY MEDICINE

## 2022-11-14 PROCEDURE — 25000003 PHARM REV CODE 250: Performed by: FAMILY MEDICINE

## 2022-11-14 PROCEDURE — 96365 THER/PROPH/DIAG IV INF INIT: CPT

## 2022-11-14 PROCEDURE — 93005 ELECTROCARDIOGRAM TRACING: CPT

## 2022-11-14 PROCEDURE — 85025 COMPLETE CBC W/AUTO DIFF WBC: CPT | Performed by: FAMILY MEDICINE

## 2022-11-14 PROCEDURE — 80053 COMPREHEN METABOLIC PANEL: CPT | Performed by: FAMILY MEDICINE

## 2022-11-14 PROCEDURE — 96361 HYDRATE IV INFUSION ADD-ON: CPT

## 2022-11-14 PROCEDURE — 63600175 PHARM REV CODE 636 W HCPCS

## 2022-11-14 PROCEDURE — 63600175 PHARM REV CODE 636 W HCPCS: Performed by: INTERNAL MEDICINE

## 2022-11-14 PROCEDURE — 80053 COMPREHEN METABOLIC PANEL: CPT | Performed by: INTERNAL MEDICINE

## 2022-11-14 PROCEDURE — 82077 ASSAY SPEC XCP UR&BREATH IA: CPT | Performed by: FAMILY MEDICINE

## 2022-11-14 PROCEDURE — 99284 EMERGENCY DEPT VISIT MOD MDM: CPT | Mod: 25

## 2022-11-14 RX ORDER — LORAZEPAM 2 MG/ML
INJECTION INTRAMUSCULAR
Status: COMPLETED
Start: 2022-11-14 | End: 2022-11-14

## 2022-11-14 RX ORDER — LEVETIRACETAM 500 MG/1
500 TABLET ORAL 2 TIMES DAILY
Qty: 60 TABLET | Refills: 3 | Status: ON HOLD | OUTPATIENT
Start: 2022-11-14 | End: 2023-05-08

## 2022-11-14 RX ORDER — LEVETIRACETAM 5 MG/ML
INJECTION INTRAVASCULAR
Status: COMPLETED
Start: 2022-11-14 | End: 2022-11-14

## 2022-11-14 RX ORDER — HYDROXYZINE PAMOATE 25 MG/1
25 CAPSULE ORAL EVERY 8 HOURS PRN
Qty: 30 CAPSULE | Refills: 1 | Status: SHIPPED | OUTPATIENT
Start: 2022-11-14

## 2022-11-14 RX ORDER — LEVETIRACETAM 5 MG/ML
1000 INJECTION INTRAVASCULAR
Status: COMPLETED | OUTPATIENT
Start: 2022-11-14 | End: 2022-11-14

## 2022-11-14 RX ORDER — LORAZEPAM 2 MG/ML
2 INJECTION INTRAMUSCULAR
Status: COMPLETED | OUTPATIENT
Start: 2022-11-14 | End: 2022-11-14

## 2022-11-14 RX ADMIN — LORAZEPAM 2 MG: 2 INJECTION INTRAMUSCULAR at 01:11

## 2022-11-14 RX ADMIN — LORAZEPAM 2 MG: 2 INJECTION INTRAMUSCULAR; INTRAVENOUS at 01:11

## 2022-11-14 RX ADMIN — LEVETIRACETAM INJECTION 1000 MG: 5 INJECTION INTRAVENOUS at 02:11

## 2022-11-14 RX ADMIN — SODIUM CHLORIDE 1000 ML: 9 INJECTION, SOLUTION INTRAVENOUS at 01:11

## 2022-11-14 RX ADMIN — LEVETIRACETAM INJECTION 1000 MG: 5 INJECTION INTRAVENOUS at 01:11

## 2022-11-14 NOTE — ED PROVIDER NOTES
"Encounter Date: 11/14/2022       History     Chief Complaint   Patient presents with    Seizures     Patient  had   2  seizures   witnessed   by  [facility].  Was  given  a  total  of 10mgs  of  versed.  Patient  is  taking  Keppra   1500mgs   BID.  PATIENT  ALSO HAS  BEEN  DRINKING   ETOH  TONIGHT     27-year-old female presents to the ED with complaint of seizure.  EMS reports patient had 2 seizures upon arrival.  Mother reports the patient was with her cousin-"drinking alcohol and cousin reports patient had a seizure ".  Mother reports that patient takes Keppra every day but forgot to take it today.     5-7  minutes after pt arrival. Pt started seizing. I was called to bedside.RAFI Vargas at bedside with pt rolled over to her right side,  yanker suction on .   pt VS reviewed, pt not hypoxic, Pt given 4mg of ativan.   Of note-  while seizing, pt reports " I'm sorry "   while seizing, pt calls out to her mother while seizing.     After seizure finished -  no tongue biting observed, and no incontinence noted.  Pt was given ativan therefore currently drowsy , unsure if pt with true postictal state given her comments while seizing.   Mother at bedside report " this is how her seizures are"     The history is provided by the patient and a parent. No  was used.   Illness   The current episode started just prior to arrival. The problem occurs frequently. The problem has been unchanged. Nothing relieves the symptoms. Nothing aggravates the symptoms. Pertinent negatives include no fever, no abdominal pain, no nausea, no vomiting, no muscle aches, no cough, no shortness of breath, no wheezing and no rash.   Review of patient's allergies indicates:   Allergen Reactions    Tomato Swelling     Past Medical History:   Diagnosis Date    Heart valve problem     Seizures     Thyroid disease      Past Surgical History:   Procedure Laterality Date    CHOLECYSTECTOMY       Family History   Problem Relation " Age of Onset    Hypertension Mother     Migraines Mother      Social History     Tobacco Use    Smoking status: Never    Smokeless tobacco: Never   Substance Use Topics    Alcohol use: Yes    Drug use: Never     Review of Systems   Constitutional:  Negative for fever.   Respiratory:  Negative for cough, shortness of breath and wheezing.    Cardiovascular:  Negative for chest pain.   Gastrointestinal:  Negative for abdominal pain, nausea and vomiting.   Skin:  Negative for rash.   Neurological:  Negative for dizziness and weakness.   All other systems reviewed and are negative.    Physical Exam     Initial Vitals [11/14/22 0056]   BP Pulse Resp Temp SpO2   114/79 (!) 119 18 98.3 °F (36.8 °C) 98 %      MAP       --         Physical Exam    Nursing note and vitals reviewed.  Constitutional: She appears well-developed.   HENT:   Head: Normocephalic and atraumatic.   Eyes: Conjunctivae are normal.   Cardiovascular:  Normal heart sounds and intact distal pulses.           Pulmonary/Chest: Breath sounds normal.   Abdominal: Abdomen is soft. Bowel sounds are normal. There is no abdominal tenderness. There is no rebound and no guarding.   Musculoskeletal:         General: Normal range of motion.     Neurological: She is alert and oriented to person, place, and time. Gait normal. GCS score is 15. GCS eye subscore is 4. GCS verbal subscore is 5. GCS motor subscore is 6.   Skin: Skin is warm and dry. Capillary refill takes less than 2 seconds.   Psychiatric: She has a normal mood and affect. Her behavior is normal. Judgment and thought content normal.       ED Course   Critical Care    Date/Time: 11/14/2022 1:15 AM  Performed by: Ismael Alex MD  Authorized by: Ismael Alex MD   Total critical care time (exclusive of procedural time) : 30 minutes  Critical care was necessary to treat or prevent imminent or life-threatening deterioration of the following conditions: Seizure, alcohol abuse.  Critical care was time spent personally by  me on the following activities: development of treatment plan with patient or surrogate, evaluation of patient's response to treatment, examination of patient, obtaining history from patient or surrogate, ordering and performing treatments and interventions, ordering and review of laboratory studies, ordering and review of radiographic studies, pulse oximetry, re-evaluation of patient's condition and review of old charts.      Labs Reviewed   COMPREHENSIVE METABOLIC PANEL - Abnormal; Notable for the following components:       Result Value    Glucose Level 102 (*)     Alkaline Phosphatase 39 (*)     All other components within normal limits   ALCOHOL,MEDICAL (ETHANOL) - Abnormal; Notable for the following components:    Ethanol Level 196.0 (*)     All other components within normal limits   CBC WITH DIFFERENTIAL - Abnormal; Notable for the following components:    WBC 4.0 (*)     RBC 3.46 (*)     Hgb 11.7 (*)     Hct 34.7 (*)     .3 (*)     MCH 33.8 (*)     Neut # 1.9 (*)     All other components within normal limits   COVID/FLU A&B PCR - Normal    Narrative:     The Xpert Xpress SARS-CoV-2/FLU/RSV plus is a rapid, multiplexed real-time PCR test intended for the simultaneous qualitative detection and differentiation of SARS-CoV-2, Influenza A, Influenza B, and respiratory syncytial virus (RSV) viral RNA in either nasopharyngeal swab or nasal swab specimens.         MAGNESIUM - Normal   TROPONIN I - Normal   HCG, SERUM, QUALITATIVE - Normal   CBC W/ AUTO DIFFERENTIAL    Narrative:     The following orders were created for panel order CBC auto differential.  Procedure                               Abnormality         Status                     ---------                               -----------         ------                     CBC with Differential[[id number]]        Abnormal            Final result                 Please view results for these tests on the individual orders.   DRUG SCREEN, URINE (BEAKER)    URINALYSIS, REFLEX TO URINE CULTURE   EXTRA TUBES    Narrative:     The following orders were created for panel order EXTRA TUBES.  Procedure                               Abnormality         Status                     ---------                               -----------         ------                     Light Blue Top Hold[[id number]]                              In process                 Red Top Hold[[id number]]                                     In process                   Please view results for these tests on the individual orders.   LIGHT BLUE TOP HOLD   RED TOP HOLD   POCT URINE PREGNANCY     EKG Readings: (Independently Interpreted)   Initial Reading: No STEMI. Rhythm: Normal Sinus Rhythm. Heart Rate: 99. Ectopy: No Ectopy. ST Segments: Normal ST Segments. T Waves: Normal. Other Findings: Prolonged QT Interval.     Imaging Results              CT Head Without Contrast (Preliminary result)  Result time 11/14/22 04:45:01      Preliminary result by Dudley Hernandez MD (11/14/22 04:45:01)                   Narrative:    START OF REPORT:  Technique: CT of the head was performed without intravenous contrast with axial as well as coronal and sagittal images.    Comparison: None.    Dosage Information: Automated exposure control was utilized.    Clinical history: Patient had 2 seizures witnessed by [facility]. Was given a total of 10mgs of versed. Patient is taking Keppra 1500mgs BID. PATIENT ALSO HAS BEEN DRINKING ETOH TONIGHT.    Findings:  Hemorrhage: No acute intracranial hemorrhage is seen.  CSF spaces: The ventricles sulci and basal cisterns are within normal limits.  Brain parenchyma: Unremarkable with preservation of the grey white junction throughout.  Cerebellum: Unremarkable.  Sella and skull base: The sella appears to be within normal limits for age.  Herniation: None.  Intracranial calcifications: Incidental note is made of some pineal region calcification.  Calvarium: No acute linear or  depressed skull fracture is seen.    Maxillofacial Structures:  Paranasal sinuses: The visualized paranasal sinuses appear clear with no mucoperiosteal thickening or air fluid levels identified.  Orbits: The orbits appear unremarkable.  Zygomatic arches: The zygomatic arches are intact and unremarkable.  Temporal bones and mastoids: The temporal bones and mastoids appear unremarkable.  TMJ: The mandibular condyles appear normally placed with respect to the mandibular fossa.      Impression:  1. No acute intracranial process identified. Details as above.                          Preliminary result by Interface, Rad Results In (11/14/22 04:45:01)                   Narrative:    START OF REPORT:  Technique: CT of the head was performed without intravenous contrast with axial as well as coronal and sagittal images.    Comparison: None.    Dosage Information: Automated exposure control was utilized.    Clinical history: Patient had 2 seizures witnessed by [facility]. Was given a total of 10mgs of versed. Patient is taking Keppra 1500mgs BID. PATIENT ALSO HAS BEEN DRINKING ETOH TONIGHT.    Findings:  Hemorrhage: No acute intracranial hemorrhage is seen.  CSF spaces: The ventricles sulci and basal cisterns are within normal limits.  Brain parenchyma: Unremarkable with preservation of the grey white junction throughout.  Cerebellum: Unremarkable.  Sella and skull base: The sella appears to be within normal limits for age.  Herniation: None.  Intracranial calcifications: Incidental note is made of some pineal region calcification.  Calvarium: No acute linear or depressed skull fracture is seen.    Maxillofacial Structures:  Paranasal sinuses: The visualized paranasal sinuses appear clear with no mucoperiosteal thickening or air fluid levels identified.  Orbits: The orbits appear unremarkable.  Zygomatic arches: The zygomatic arches are intact and unremarkable.  Temporal bones and mastoids: The temporal bones and  mastoids appear unremarkable.  TMJ: The mandibular condyles appear normally placed with respect to the mandibular fossa.      Impression:  1. No acute intracranial process identified. Details as above.                                         CT Cervical Spine Without Contrast (Preliminary result)  Result time 11/14/22 04:42:40      Preliminary result by Dudley Hernandez MD (11/14/22 04:42:40)                   Narrative:    START OF REPORT:  Technique: CT of the cervical spine was performed without intravenous contrast with axial as well as sagittal and coronal images.    Comparison: None.    Dosage Information: Automated exposure control was utilized.    Clinical history: Patient had 2 seizures witnessed by [facility]. Was given a total of 10mgs of versed. Patient is taking Keppra 1500mgs BID. PATIENT ALSO HAS BEEN DRINKING ETOH TONIGHT.    Findings:  Lung apices: The visualized lung apices appear unremarkable.  Spine:  Spinal canal: The spinal canal appears unremarkable.  Spinal cord: The spinal cord appears unremarkable.  Mineralization: Within normal limits.  Scoliosis: No significant scoliosis is seen.  Vertebral Fusion: No vertebral fusion is identified.  Listhesis: No significant listhesis is identified.  Lordosis: The cervical lordosis is maintained.  Intervertebral disc spaces: The intervertebral discs are preserved throughout.  Fractures: No acute cervical spine fracture dislocation or subluxation is seen.    Miscellaneous:  Soft Tissues: Unremarkable.      Impression:  1. No acute cervical spine fracture dislocation or subluxation is seen.  2. Details as noted above.                          Preliminary result by Interface, Rad Results In (11/14/22 04:42:40)                   Narrative:    START OF REPORT:  Technique: CT of the cervical spine was performed without intravenous contrast with axial as well as sagittal and coronal images.    Comparison: None.    Dosage Information: Automated exposure  control was utilized.    Clinical history: Patient had 2 seizures witnessed by [facility]. Was given a total of 10mgs of versed. Patient is taking Keppra 1500mgs BID. PATIENT ALSO HAS BEEN DRINKING ETOH TONIGHT.    Findings:  Lung apices: The visualized lung apices appear unremarkable.  Spine:  Spinal canal: The spinal canal appears unremarkable.  Spinal cord: The spinal cord appears unremarkable.  Mineralization: Within normal limits.  Scoliosis: No significant scoliosis is seen.  Vertebral Fusion: No vertebral fusion is identified.  Listhesis: No significant listhesis is identified.  Lordosis: The cervical lordosis is maintained.  Intervertebral disc spaces: The intervertebral discs are preserved throughout.  Fractures: No acute cervical spine fracture dislocation or subluxation is seen.    Miscellaneous:  Soft Tissues: Unremarkable.      Impression:  1. No acute cervical spine fracture dislocation or subluxation is seen.  2. Details as noted above.                                         Medications   levETIRAcetam in NaCl (iso-os) (KEPPRA) 500 mg/100 mL IVPB (0 mg  Stopped 11/14/22 0336)   LORazepam injection 2 mg (2 mg Intravenous Given 11/14/22 0102)   sodium chloride 0.9% bolus 1,000 mL (0 mLs Intravenous Stopped 11/14/22 0234)   LORazepam injection 2 mg (2 mg Intravenous Given 11/14/22 0102)     Medical Decision Making:   Patient had 1 seizure in ED observed by medical staff Ativan given.  Since then no further seizure activity.  Her seizure in the ED today is questionable-differential diagnosis include alcohol abuse versus convulsions versus other.  Patient resting comfortably in hospital bed.  Mother is at bedside.  Discussed all labs and imaging with mother at bedside.  Strict ER precautions have been given to the mother and patient who verbalized understanding.  Patient is stable for discharge.    Patient presents with seizure or seizure-like symptoms. I have no suspicion of an intracranial,  traumatic, infectious, metabolic, toxic, cardiovascular (specifically arrhythmia), or other emergent medical condition requiring further intervention. Seizure precautions were discussed with the patient and mother at bedside,  specifically not to swim unattended, not to operate motor vehicles or other machinery, and to avoid heights or other areas where falls may occur until cleared by primary care physician. Patient is safe for discharge.                          Clinical Impression:   Final diagnoses:  [R56.9] Seizure  [G40.909] Seizure disorder  [F10.10] Alcohol abuse (Primary)        ED Disposition Condition    Discharge Stable          ED Prescriptions    None       Follow-up Information       Follow up With Specialties Details Why Contact Info    [facility] Emergency Medicine  As needed, If symptoms worsen, return any time [facility] [facility] [address] [phone]    Follow-up with your PCP in 2-3 days for further evaluation.  It is your responsibility to call and make an appointment as soon as possible.                 Ismael Alex MD  11/14/22 0511       Ismael Alex MD  11/14/22 0513

## 2022-11-14 NOTE — ED PROVIDER NOTES
Encounter Date: 11/14/2022       History     Chief Complaint   Patient presents with    Seizures     Pt had seizure at home per EMS     Presents due to seizure event, Hx of seizures R/O her Keppra. Pt anxious on arrival, crying but AAOx3, denies any pain or injuries.    The history is provided by the patient, a relative and a parent.   Review of patient's allergies indicates:   Allergen Reactions    Tomato Swelling     Past Medical History:   Diagnosis Date    Heart valve problem     Seizures     Thyroid disease      Past Surgical History:   Procedure Laterality Date    CHOLECYSTECTOMY       Family History   Problem Relation Age of Onset    Hypertension Mother     Migraines Mother      Social History     Tobacco Use    Smoking status: Never    Smokeless tobacco: Never   Substance Use Topics    Alcohol use: Yes    Drug use: Never     Review of Systems   Constitutional:  Negative for fever.   HENT:  Negative for sore throat.    Respiratory:  Negative for shortness of breath.    Cardiovascular:  Negative for chest pain.   Gastrointestinal:  Negative for nausea.   Genitourinary:  Negative for dysuria.   Musculoskeletal:  Negative for back pain.   Skin:  Negative for rash.   Neurological:  Positive for seizures. Negative for weakness.   Hematological:  Does not bruise/bleed easily.   Psychiatric/Behavioral:  The patient is nervous/anxious.    All other systems reviewed and are negative.    Physical Exam     Initial Vitals [11/14/22 1339]   BP Pulse Resp Temp SpO2   127/87 109 19 98.6 °F (37 °C) 96 %      MAP       --         Physical Exam    Nursing note and vitals reviewed.  Constitutional: She appears well-developed.   HENT:   Head: Normocephalic and atraumatic.   Mouth/Throat: Oropharynx is clear and moist.   Eyes: Conjunctivae and EOM are normal. Pupils are equal, round, and reactive to light.   Neck: Neck supple.   Normal range of motion.  Cardiovascular:  Normal rate, regular rhythm, normal heart sounds and intact  distal pulses.           Pulmonary/Chest: Breath sounds normal.   Abdominal: Abdomen is soft. Bowel sounds are normal. She exhibits no distension. There is no abdominal tenderness. There is no rebound and no guarding.   Musculoskeletal:         General: No edema. Normal range of motion.      Cervical back: Normal range of motion and neck supple.     Neurological: She is alert and oriented to person, place, and time. She has normal strength. GCS score is 15. GCS eye subscore is 4. GCS verbal subscore is 5. GCS motor subscore is 6.   Skin: Skin is warm and dry. No rash noted.   Psychiatric: Her behavior is normal. Judgment and thought content normal.       ED Course   Procedures  Labs Reviewed   COMPREHENSIVE METABOLIC PANEL - Abnormal; Notable for the following components:       Result Value    Alkaline Phosphatase 39 (*)     All other components within normal limits   EXTRA TUBES    Narrative:     The following orders were created for panel order EXTRA TUBES.  Procedure                               Abnormality         Status                     ---------                               -----------         ------                     Light Blue Top Hold[[id number]]                              In process                 Lavender Top Hold[[id number]]                                In process                 Gold Top Hold[[id number]]                                    In process                   Please view results for these tests on the individual orders.   LIGHT BLUE TOP HOLD   LAVENDER TOP HOLD   GOLD TOP HOLD   POCT URINE PREGNANCY          Imaging Results    None          Medications   levETIRAcetam in NaCl (iso-os) IVPB 1,000 mg (0 mg Intravenous Stopped 11/14/22 1500)                              Clinical Impression:   Final diagnoses:  [G40.909] Seizure disorder (Primary)      ED Disposition Condition    Discharge Stable          ED Prescriptions       Medication Sig Dispense Start Date End Date Auth. Provider     levETIRAcetam (KEPPRA) 500 MG Tab Take 1 tablet (500 mg total) by mouth 2 (two) times daily. 60 tablet 11/14/2022 12/14/2022 Timur Mcpherson MD    hydrOXYzine pamoate (VISTARIL) 25 MG Cap Take 1 capsule (25 mg total) by mouth every 8 (eight) hours as needed (Anxiety). 30 capsule 11/14/2022 -- Timur Mcpherson MD          Follow-up Information       Follow up With Specialties Details Why Contact Info    [facility] Emergency Medicine  If symptoms worsen [address] [facility] [address] [phone]             Timur Mcpherson MD  11/14/22 1852

## 2023-05-06 ENCOUNTER — HOSPITAL ENCOUNTER (OUTPATIENT)
Facility: HOSPITAL | Age: 28
Discharge: HOME | End: 2023-05-08
Attending: EMERGENCY MEDICINE | Admitting: INTERNAL MEDICINE
Payer: MEDICAID

## 2023-05-06 DIAGNOSIS — T14.8XXA SUPERFICIAL ABRASION: ICD-10-CM

## 2023-05-06 DIAGNOSIS — G40.909 RECURRENT SEIZURES: Primary | ICD-10-CM

## 2023-05-06 DIAGNOSIS — R00.0 TACHYCARDIA: ICD-10-CM

## 2023-05-06 DIAGNOSIS — E87.6 HYPOKALEMIA: ICD-10-CM

## 2023-05-06 DIAGNOSIS — R56.9 SEIZURE: ICD-10-CM

## 2023-05-06 LAB
ANION GAP SERPL CALC-SCNC: 14 MEQ/L
BASOPHILS # BLD AUTO: 0.01 X10(3)/MCL
BASOPHILS NFR BLD AUTO: 0.2 %
BUN SERPL-MCNC: 6.7 MG/DL (ref 7–18.7)
CALCIUM SERPL-MCNC: 9.5 MG/DL (ref 8.4–10.2)
CHLORIDE SERPL-SCNC: 109 MMOL/L (ref 98–107)
CO2 SERPL-SCNC: 18 MMOL/L (ref 22–29)
CREAT SERPL-MCNC: 0.66 MG/DL (ref 0.55–1.02)
CREAT/UREA NIT SERPL: 10
EOSINOPHIL # BLD AUTO: 0 X10(3)/MCL (ref 0–0.9)
EOSINOPHIL NFR BLD AUTO: 0 %
ERYTHROCYTE [DISTWIDTH] IN BLOOD BY AUTOMATED COUNT: 12.2 % (ref 11.5–17)
GFR SERPLBLD CREATININE-BSD FMLA CKD-EPI: >60 MLS/MIN/1.73/M2
GLUCOSE SERPL-MCNC: 93 MG/DL (ref 74–100)
HCT VFR BLD AUTO: 35.3 % (ref 37–47)
HGB BLD-MCNC: 11.7 G/DL (ref 12–16)
IMM GRANULOCYTES # BLD AUTO: 0.02 X10(3)/MCL (ref 0–0.04)
IMM GRANULOCYTES NFR BLD AUTO: 0.4 %
LYMPHOCYTES # BLD AUTO: 1.75 X10(3)/MCL (ref 0.6–4.6)
LYMPHOCYTES NFR BLD AUTO: 32.5 %
MCH RBC QN AUTO: 33.7 PG (ref 27–31)
MCHC RBC AUTO-ENTMCNC: 33.1 G/DL (ref 33–36)
MCV RBC AUTO: 101.7 FL (ref 80–94)
MONOCYTES # BLD AUTO: 0.54 X10(3)/MCL (ref 0.1–1.3)
MONOCYTES NFR BLD AUTO: 10 %
NEUTROPHILS # BLD AUTO: 3.06 X10(3)/MCL (ref 2.1–9.2)
NEUTROPHILS NFR BLD AUTO: 56.9 %
NRBC BLD AUTO-RTO: 0 %
PLATELET # BLD AUTO: 204 X10(3)/MCL (ref 130–400)
PMV BLD AUTO: 10 FL (ref 7.4–10.4)
POTASSIUM SERPL-SCNC: 2.7 MMOL/L (ref 3.5–5.1)
RBC # BLD AUTO: 3.47 X10(6)/MCL (ref 4.2–5.4)
SODIUM SERPL-SCNC: 141 MMOL/L (ref 136–145)
WBC # SPEC AUTO: 5.38 X10(3)/MCL (ref 4.5–11.5)

## 2023-05-06 PROCEDURE — 96375 TX/PRO/DX INJ NEW DRUG ADDON: CPT

## 2023-05-06 PROCEDURE — 80048 BASIC METABOLIC PNL TOTAL CA: CPT | Performed by: EMERGENCY MEDICINE

## 2023-05-06 PROCEDURE — 96365 THER/PROPH/DIAG IV INF INIT: CPT

## 2023-05-06 PROCEDURE — 63600175 PHARM REV CODE 636 W HCPCS: Performed by: EMERGENCY MEDICINE

## 2023-05-06 PROCEDURE — 83735 ASSAY OF MAGNESIUM: CPT | Performed by: EMERGENCY MEDICINE

## 2023-05-06 PROCEDURE — 82077 ASSAY SPEC XCP UR&BREATH IA: CPT | Performed by: EMERGENCY MEDICINE

## 2023-05-06 PROCEDURE — 84443 ASSAY THYROID STIM HORMONE: CPT | Performed by: STUDENT IN AN ORGANIZED HEALTH CARE EDUCATION/TRAINING PROGRAM

## 2023-05-06 PROCEDURE — 81025 URINE PREGNANCY TEST: CPT | Performed by: EMERGENCY MEDICINE

## 2023-05-06 PROCEDURE — 99285 EMERGENCY DEPT VISIT HI MDM: CPT | Mod: 25

## 2023-05-06 PROCEDURE — 84439 ASSAY OF FREE THYROXINE: CPT | Performed by: STUDENT IN AN ORGANIZED HEALTH CARE EDUCATION/TRAINING PROGRAM

## 2023-05-06 PROCEDURE — 85025 COMPLETE CBC W/AUTO DIFF WBC: CPT | Performed by: EMERGENCY MEDICINE

## 2023-05-06 PROCEDURE — 82607 VITAMIN B-12: CPT | Performed by: STUDENT IN AN ORGANIZED HEALTH CARE EDUCATION/TRAINING PROGRAM

## 2023-05-06 PROCEDURE — 93005 ELECTROCARDIOGRAM TRACING: CPT

## 2023-05-06 PROCEDURE — 82550 ASSAY OF CK (CPK): CPT | Performed by: STUDENT IN AN ORGANIZED HEALTH CARE EDUCATION/TRAINING PROGRAM

## 2023-05-06 RX ORDER — LEVETIRACETAM 10 MG/ML
1000 INJECTION INTRAVASCULAR
Status: COMPLETED | OUTPATIENT
Start: 2023-05-06 | End: 2023-05-06

## 2023-05-06 RX ORDER — SODIUM CHLORIDE AND POTASSIUM CHLORIDE 150; 900 MG/100ML; MG/100ML
INJECTION, SOLUTION INTRAVENOUS
Status: COMPLETED | OUTPATIENT
Start: 2023-05-06 | End: 2023-05-07

## 2023-05-06 RX ORDER — LEVETIRACETAM 5 MG/ML
INJECTION INTRAVASCULAR
Status: DISPENSED
Start: 2023-05-06 | End: 2023-05-07

## 2023-05-06 RX ORDER — LORAZEPAM 2 MG/ML
2 INJECTION INTRAMUSCULAR
Status: COMPLETED | OUTPATIENT
Start: 2023-05-06 | End: 2023-05-06

## 2023-05-06 RX ORDER — ONDANSETRON 2 MG/ML
4 INJECTION INTRAMUSCULAR; INTRAVENOUS
Status: COMPLETED | OUTPATIENT
Start: 2023-05-06 | End: 2023-05-06

## 2023-05-06 RX ORDER — LEVETIRACETAM 10 MG/ML
2000 INJECTION INTRAVASCULAR
Status: COMPLETED | OUTPATIENT
Start: 2023-05-07 | End: 2023-05-07

## 2023-05-06 RX ADMIN — LEVETIRACETAM 1000 MG: 10 INJECTION, SOLUTION INTRAVENOUS at 10:05

## 2023-05-06 RX ADMIN — ONDANSETRON 4 MG: 2 INJECTION INTRAMUSCULAR; INTRAVENOUS at 10:05

## 2023-05-06 RX ADMIN — LORAZEPAM 2 MG: 2 INJECTION INTRAMUSCULAR; INTRAVENOUS at 11:05

## 2023-05-07 PROBLEM — R56.9 SEIZURE: Status: ACTIVE | Noted: 2023-05-07

## 2023-05-07 LAB
ALBUMIN SERPL-MCNC: 4.2 G/DL (ref 3.5–5)
ALBUMIN/GLOB SERPL: 1.3 RATIO (ref 1.1–2)
ALP SERPL-CCNC: 30 UNIT/L (ref 40–150)
ALT SERPL-CCNC: 18 UNIT/L (ref 0–55)
AMPHET UR QL SCN: NEGATIVE
APPEARANCE UR: CLEAR
AST SERPL-CCNC: 27 UNIT/L (ref 5–34)
B-HCG SERPL QL: NEGATIVE
B-HCG UR QL: NEGATIVE
B-OH-BUTYR SERPL-MCNC: 0.05 MMOL/L
BACTERIA #/AREA URNS AUTO: ABNORMAL /HPF
BARBITURATE SCN PRESENT UR: NEGATIVE
BASOPHILS # BLD AUTO: 0.01 X10(3)/MCL
BASOPHILS NFR BLD AUTO: 0.1 %
BENZODIAZ UR QL SCN: POSITIVE
BILIRUB UR QL STRIP.AUTO: NEGATIVE MG/DL
BILIRUBIN DIRECT+TOT PNL SERPL-MCNC: 0.2 MG/DL
BUN SERPL-MCNC: 5.6 MG/DL (ref 7–18.7)
CALCIUM SERPL-MCNC: 9.1 MG/DL (ref 8.4–10.2)
CANNABINOIDS UR QL SCN: NEGATIVE
CHLORIDE SERPL-SCNC: 110 MMOL/L (ref 98–107)
CK SERPL-CCNC: 222 U/L (ref 29–168)
CO2 SERPL-SCNC: 19 MMOL/L (ref 22–29)
COCAINE UR QL SCN: NEGATIVE
COLOR UR AUTO: COLORLESS
CREAT SERPL-MCNC: 0.61 MG/DL (ref 0.55–1.02)
CTP QC/QA: YES
EOSINOPHIL # BLD AUTO: 0 X10(3)/MCL (ref 0–0.9)
EOSINOPHIL NFR BLD AUTO: 0 %
ERYTHROCYTE [DISTWIDTH] IN BLOOD BY AUTOMATED COUNT: 12.4 % (ref 11.5–17)
ETHANOL SERPL-MCNC: 253 MG/DL
FENTANYL UR QL SCN: NEGATIVE
FOLATE SERPL-MCNC: 3.7 NG/ML (ref 7–31.4)
GFR SERPLBLD CREATININE-BSD FMLA CKD-EPI: >60 MLS/MIN/1.73/M2
GLOBULIN SER-MCNC: 3.2 GM/DL (ref 2.4–3.5)
GLUCOSE SERPL-MCNC: 97 MG/DL (ref 74–100)
GLUCOSE UR QL STRIP.AUTO: NORMAL MG/DL
HCT VFR BLD AUTO: 37.5 % (ref 37–47)
HGB BLD-MCNC: 12.5 G/DL (ref 12–16)
HYALINE CASTS #/AREA URNS LPF: ABNORMAL /LPF
IMM GRANULOCYTES # BLD AUTO: 0.04 X10(3)/MCL (ref 0–0.04)
IMM GRANULOCYTES NFR BLD AUTO: 0.4 %
KETONES UR QL STRIP.AUTO: NEGATIVE MG/DL
LACTATE SERPL-SCNC: 2.5 MMOL/L (ref 0.5–2.2)
LACTATE SERPL-SCNC: 2.7 MMOL/L (ref 0.5–2.2)
LEUKOCYTE ESTERASE UR QL STRIP.AUTO: NEGATIVE UNIT/L
LYMPHOCYTES # BLD AUTO: 1.53 X10(3)/MCL (ref 0.6–4.6)
LYMPHOCYTES NFR BLD AUTO: 16.9 %
MAGNESIUM SERPL-MCNC: 2.1 MG/DL (ref 1.6–2.6)
MCH RBC QN AUTO: 33.8 PG (ref 27–31)
MCHC RBC AUTO-ENTMCNC: 33.3 G/DL (ref 33–36)
MCV RBC AUTO: 101.4 FL (ref 80–94)
MDMA UR QL SCN: NEGATIVE
MONOCYTES # BLD AUTO: 0.75 X10(3)/MCL (ref 0.1–1.3)
MONOCYTES NFR BLD AUTO: 8.3 %
NEUTROPHILS # BLD AUTO: 6.7 X10(3)/MCL (ref 2.1–9.2)
NEUTROPHILS NFR BLD AUTO: 74.3 %
NITRITE UR QL STRIP.AUTO: NEGATIVE
NRBC BLD AUTO-RTO: 0 %
OPIATES UR QL SCN: NEGATIVE
PCP UR QL: NEGATIVE
PH UR STRIP.AUTO: 5.5 [PH]
PH UR: 5.5 [PH] (ref 3–11)
PHOSPHATE SERPL-MCNC: 3.5 MG/DL (ref 2.3–4.7)
PLATELET # BLD AUTO: 204 X10(3)/MCL (ref 130–400)
PMV BLD AUTO: 10.2 FL (ref 7.4–10.4)
POTASSIUM SERPL-SCNC: 3.1 MMOL/L (ref 3.5–5.1)
PROT SERPL-MCNC: 7.4 GM/DL (ref 6.4–8.3)
PROT UR QL STRIP.AUTO: NEGATIVE MG/DL
RBC # BLD AUTO: 3.7 X10(6)/MCL (ref 4.2–5.4)
RBC #/AREA URNS AUTO: ABNORMAL /HPF
RBC UR QL AUTO: NEGATIVE UNIT/L
SODIUM SERPL-SCNC: 142 MMOL/L (ref 136–145)
SP GR UR STRIP.AUTO: 1
SQUAMOUS #/AREA URNS LPF: ABNORMAL /HPF
T4 FREE SERPL-MCNC: 0.97 NG/DL (ref 0.7–1.48)
TSH SERPL-ACNC: 1.64 UIU/ML (ref 0.35–4.94)
UROBILINOGEN UR STRIP-ACNC: NORMAL MG/DL
VIT B12 SERPL-MCNC: 800 PG/ML (ref 213–816)
WBC # SPEC AUTO: 9.03 X10(3)/MCL (ref 4.5–11.5)
WBC #/AREA URNS AUTO: ABNORMAL /HPF

## 2023-05-07 PROCEDURE — 25000003 PHARM REV CODE 250

## 2023-05-07 PROCEDURE — 96366 THER/PROPH/DIAG IV INF ADDON: CPT

## 2023-05-07 PROCEDURE — 84100 ASSAY OF PHOSPHORUS: CPT | Mod: 91

## 2023-05-07 PROCEDURE — 96361 HYDRATE IV INFUSION ADD-ON: CPT

## 2023-05-07 PROCEDURE — 38241 TRANSPLT AUTOL HCT/DONOR: CPT

## 2023-05-07 PROCEDURE — 83735 ASSAY OF MAGNESIUM: CPT

## 2023-05-07 PROCEDURE — G0378 HOSPITAL OBSERVATION PER HR: HCPCS

## 2023-05-07 PROCEDURE — 96372 THER/PROPH/DIAG INJ SC/IM: CPT | Mod: 59 | Performed by: STUDENT IN AN ORGANIZED HEALTH CARE EDUCATION/TRAINING PROGRAM

## 2023-05-07 PROCEDURE — 63600175 PHARM REV CODE 636 W HCPCS: Performed by: EMERGENCY MEDICINE

## 2023-05-07 PROCEDURE — 83605 ASSAY OF LACTIC ACID: CPT | Mod: 91 | Performed by: STUDENT IN AN ORGANIZED HEALTH CARE EDUCATION/TRAINING PROGRAM

## 2023-05-07 PROCEDURE — 96367 TX/PROPH/DG ADDL SEQ IV INF: CPT

## 2023-05-07 PROCEDURE — 84703 CHORIONIC GONADOTROPIN ASSAY: CPT | Performed by: EMERGENCY MEDICINE

## 2023-05-07 PROCEDURE — 84100 ASSAY OF PHOSPHORUS: CPT | Performed by: STUDENT IN AN ORGANIZED HEALTH CARE EDUCATION/TRAINING PROGRAM

## 2023-05-07 PROCEDURE — 81001 URINALYSIS AUTO W/SCOPE: CPT | Performed by: EMERGENCY MEDICINE

## 2023-05-07 PROCEDURE — 82746 ASSAY OF FOLIC ACID SERUM: CPT | Performed by: STUDENT IN AN ORGANIZED HEALTH CARE EDUCATION/TRAINING PROGRAM

## 2023-05-07 PROCEDURE — 96375 TX/PRO/DX INJ NEW DRUG ADDON: CPT

## 2023-05-07 PROCEDURE — 82010 KETONE BODYS QUAN: CPT | Performed by: STUDENT IN AN ORGANIZED HEALTH CARE EDUCATION/TRAINING PROGRAM

## 2023-05-07 PROCEDURE — 96376 TX/PRO/DX INJ SAME DRUG ADON: CPT

## 2023-05-07 PROCEDURE — 80053 COMPREHEN METABOLIC PANEL: CPT | Performed by: STUDENT IN AN ORGANIZED HEALTH CARE EDUCATION/TRAINING PROGRAM

## 2023-05-07 PROCEDURE — 80307 DRUG TEST PRSMV CHEM ANLYZR: CPT | Performed by: STUDENT IN AN ORGANIZED HEALTH CARE EDUCATION/TRAINING PROGRAM

## 2023-05-07 PROCEDURE — 85025 COMPLETE CBC W/AUTO DIFF WBC: CPT | Performed by: STUDENT IN AN ORGANIZED HEALTH CARE EDUCATION/TRAINING PROGRAM

## 2023-05-07 PROCEDURE — 63600175 PHARM REV CODE 636 W HCPCS: Performed by: STUDENT IN AN ORGANIZED HEALTH CARE EDUCATION/TRAINING PROGRAM

## 2023-05-07 PROCEDURE — 25000003 PHARM REV CODE 250: Performed by: STUDENT IN AN ORGANIZED HEALTH CARE EDUCATION/TRAINING PROGRAM

## 2023-05-07 RX ORDER — LORAZEPAM 2 MG/ML
2 INJECTION INTRAMUSCULAR
Status: DISCONTINUED | OUTPATIENT
Start: 2023-05-07 | End: 2023-05-08 | Stop reason: HOSPADM

## 2023-05-07 RX ORDER — PROPRANOLOL HYDROCHLORIDE 10 MG/1
10 TABLET ORAL 3 TIMES DAILY
Status: DISCONTINUED | OUTPATIENT
Start: 2023-05-07 | End: 2023-05-08 | Stop reason: HOSPADM

## 2023-05-07 RX ORDER — FAMOTIDINE 10 MG/ML
20 INJECTION INTRAVENOUS DAILY
Status: COMPLETED | OUTPATIENT
Start: 2023-05-07 | End: 2023-05-08

## 2023-05-07 RX ORDER — SODIUM CHLORIDE 0.9 % (FLUSH) 0.9 %
10 SYRINGE (ML) INJECTION
Status: DISCONTINUED | OUTPATIENT
Start: 2023-05-07 | End: 2023-05-08 | Stop reason: HOSPADM

## 2023-05-07 RX ORDER — LORAZEPAM 2 MG/ML
1 INJECTION INTRAMUSCULAR
Status: DISCONTINUED | OUTPATIENT
Start: 2023-05-07 | End: 2023-05-08 | Stop reason: HOSPADM

## 2023-05-07 RX ORDER — HYDROXYZINE PAMOATE 25 MG/1
25 CAPSULE ORAL EVERY 8 HOURS PRN
Status: DISCONTINUED | OUTPATIENT
Start: 2023-05-07 | End: 2023-05-08 | Stop reason: HOSPADM

## 2023-05-07 RX ORDER — THIAMINE HCL 100 MG
100 TABLET ORAL EVERY 8 HOURS
Status: DISCONTINUED | OUTPATIENT
Start: 2023-05-09 | End: 2023-05-08 | Stop reason: HOSPADM

## 2023-05-07 RX ORDER — ONDANSETRON 4 MG/1
4 TABLET, ORALLY DISINTEGRATING ORAL ONCE
Status: COMPLETED | OUTPATIENT
Start: 2023-05-07 | End: 2023-05-07

## 2023-05-07 RX ORDER — ACETAMINOPHEN 325 MG/1
650 TABLET ORAL EVERY 6 HOURS PRN
Status: DISCONTINUED | OUTPATIENT
Start: 2023-05-07 | End: 2023-05-08

## 2023-05-07 RX ORDER — FOLIC ACID 1 MG/1
1 TABLET ORAL DAILY
Status: DISCONTINUED | OUTPATIENT
Start: 2023-05-07 | End: 2023-05-08 | Stop reason: HOSPADM

## 2023-05-07 RX ORDER — TALC
6 POWDER (GRAM) TOPICAL NIGHTLY PRN
Status: DISCONTINUED | OUTPATIENT
Start: 2023-05-07 | End: 2023-05-08 | Stop reason: HOSPADM

## 2023-05-07 RX ORDER — HYDROMORPHONE HYDROCHLORIDE 1 MG/ML
0.2 INJECTION, SOLUTION INTRAMUSCULAR; INTRAVENOUS; SUBCUTANEOUS EVERY 6 HOURS PRN
Status: COMPLETED | OUTPATIENT
Start: 2023-05-07 | End: 2023-05-08

## 2023-05-07 RX ORDER — ONDANSETRON 4 MG/1
4 TABLET, ORALLY DISINTEGRATING ORAL ONCE
Status: DISCONTINUED | OUTPATIENT
Start: 2023-05-08 | End: 2023-05-07

## 2023-05-07 RX ORDER — POTASSIUM CHLORIDE 7.45 MG/ML
10 INJECTION INTRAVENOUS
Status: DISCONTINUED | OUTPATIENT
Start: 2023-05-07 | End: 2023-05-07

## 2023-05-07 RX ORDER — LEVETIRACETAM 500 MG/1
500 TABLET ORAL 2 TIMES DAILY
Status: DISCONTINUED | OUTPATIENT
Start: 2023-05-07 | End: 2023-05-08 | Stop reason: HOSPADM

## 2023-05-07 RX ORDER — LAMOTRIGINE 25 MG/1
50 TABLET ORAL DAILY
Status: DISCONTINUED | OUTPATIENT
Start: 2023-05-07 | End: 2023-05-08 | Stop reason: HOSPADM

## 2023-05-07 RX ORDER — ENOXAPARIN SODIUM 100 MG/ML
40 INJECTION SUBCUTANEOUS EVERY 24 HOURS
Status: DISCONTINUED | OUTPATIENT
Start: 2023-05-07 | End: 2023-05-08 | Stop reason: HOSPADM

## 2023-05-07 RX ORDER — KETOROLAC TROMETHAMINE 30 MG/ML
15 INJECTION, SOLUTION INTRAMUSCULAR; INTRAVENOUS EVERY 6 HOURS PRN
Status: DISPENSED | OUTPATIENT
Start: 2023-05-07 | End: 2023-05-08

## 2023-05-07 RX ADMIN — KETOROLAC TROMETHAMINE 15 MG: 30 INJECTION, SOLUTION INTRAMUSCULAR; INTRAVENOUS at 10:05

## 2023-05-07 RX ADMIN — POTASSIUM CHLORIDE AND SODIUM CHLORIDE: 900; 150 INJECTION, SOLUTION INTRAVENOUS at 12:05

## 2023-05-07 RX ADMIN — THIAMINE HYDROCHLORIDE 500 MG: 100 INJECTION, SOLUTION INTRAMUSCULAR; INTRAVENOUS at 05:05

## 2023-05-07 RX ADMIN — THIAMINE HYDROCHLORIDE 500 MG: 100 INJECTION, SOLUTION INTRAMUSCULAR; INTRAVENOUS at 09:05

## 2023-05-07 RX ADMIN — FOLIC ACID 1 MG: 1 TABLET ORAL at 10:05

## 2023-05-07 RX ADMIN — POTASSIUM CHLORIDE 10 MEQ: 7.46 INJECTION, SOLUTION INTRAVENOUS at 06:05

## 2023-05-07 RX ADMIN — HYDROMORPHONE HYDROCHLORIDE 0.2 MG: 1 INJECTION, SOLUTION INTRAMUSCULAR; INTRAVENOUS; SUBCUTANEOUS at 01:05

## 2023-05-07 RX ADMIN — ONDANSETRON 4 MG: 4 TABLET, ORALLY DISINTEGRATING ORAL at 11:05

## 2023-05-07 RX ADMIN — THIAMINE HYDROCHLORIDE 500 MG: 100 INJECTION, SOLUTION INTRAMUSCULAR; INTRAVENOUS at 10:05

## 2023-05-07 RX ADMIN — LEVETIRACETAM 2000 MG: 10 INJECTION, SOLUTION INTRAVENOUS at 12:05

## 2023-05-07 RX ADMIN — POTASSIUM BICARBONATE 25 MEQ: 978 TABLET, EFFERVESCENT ORAL at 10:05

## 2023-05-07 RX ADMIN — LAMOTRIGINE 50 MG: 25 TABLET ORAL at 10:05

## 2023-05-07 RX ADMIN — PROPRANOLOL HYDROCHLORIDE 10 MG: 10 TABLET ORAL at 08:05

## 2023-05-07 RX ADMIN — POTASSIUM BICARBONATE 25 MEQ: 978 TABLET, EFFERVESCENT ORAL at 03:05

## 2023-05-07 RX ADMIN — LEVETIRACETAM 500 MG: 500 TABLET, FILM COATED ORAL at 10:05

## 2023-05-07 RX ADMIN — PROPRANOLOL HYDROCHLORIDE 10 MG: 10 TABLET ORAL at 10:05

## 2023-05-07 RX ADMIN — HYDROMORPHONE HYDROCHLORIDE 0.2 MG: 1 INJECTION, SOLUTION INTRAMUSCULAR; INTRAVENOUS; SUBCUTANEOUS at 09:05

## 2023-05-07 RX ADMIN — LEVETIRACETAM 500 MG: 500 TABLET, FILM COATED ORAL at 08:05

## 2023-05-07 RX ADMIN — THERA TABS 1 TABLET: TAB at 10:05

## 2023-05-07 RX ADMIN — POTASSIUM CHLORIDE 10 MEQ: 7.46 INJECTION, SOLUTION INTRAVENOUS at 05:05

## 2023-05-07 RX ADMIN — SODIUM CHLORIDE, POTASSIUM CHLORIDE, SODIUM LACTATE AND CALCIUM CHLORIDE 1000 ML: 600; 310; 30; 20 INJECTION, SOLUTION INTRAVENOUS at 03:05

## 2023-05-07 RX ADMIN — ENOXAPARIN SODIUM 40 MG: 40 INJECTION SUBCUTANEOUS at 05:05

## 2023-05-07 RX ADMIN — PROPRANOLOL HYDROCHLORIDE 10 MG: 10 TABLET ORAL at 03:05

## 2023-05-07 RX ADMIN — FAMOTIDINE 20 MG: 10 INJECTION, SOLUTION INTRAVENOUS at 10:05

## 2023-05-07 NOTE — ED PROVIDER NOTES
ED PROVIDER NOTE  5/6/2023    CHIEF COMPLAINT:   Chief Complaint   Patient presents with    Seizures     Pt brought by [facility], pt's mom reports pt was drinking etoh just prior to arrival and had two back to back seizures, mom reports pt was trying to roll down window in almost stopped car and accidentally hit the power button, pt fell out of car, pt has road rash two both legs, pt's mom uncertain if pt hit head. Vss. Pt takes keppra daily but missed today's dose, pt had 5 mg versed IV x 2 doses once prior to arrival and once in the lobby here.        HISTORY OF PRESENT ILLNESS:   Lesly Culver is a 28 y.o. female who presents with chief complaint Seizure. Onset was just prior to arrival when she was passenger in vehicle and reportedly had felt a seizure coming on, so she attempted to roll down the window but instead had opened the door and fell out of the car. EMS administered versed, so patient is sedated and unable to provide any reliable history.  Mother reports that patient has history of pseudoseizures, and seems to have most of her seizures around times of stressful events.  Today she had been drinking alcohol and had missed her Keppra dose today but otherwise has been compliant with her Keppra.    The history is provided by a parent. The history is limited by the condition of the patient.       REVIEW OF SYSTEMS: as noted in the HPI.  NURSING NOTES REVIEWED      PAST MEDICAL/SURGICAL HISTORY:   Past Medical History:   Diagnosis Date    Heart valve problem     Seizures     Thyroid disease       Past Surgical History:   Procedure Laterality Date    CHOLECYSTECTOMY         FAMILY HISTORY:   Family History   Problem Relation Age of Onset    Hypertension Mother     Migraines Mother        SOCIAL HISTORY:   Social History     Tobacco Use    Smoking status: Never    Smokeless tobacco: Never   Substance Use Topics    Alcohol use: Yes    Drug use: Never       ALLERGIES:   Review of patient's allergies indicates:    Allergen Reactions    Tomato Swelling       PHYSICAL EXAM:  Initial Vitals [05/06/23 2159]   BP Pulse Resp Temp SpO2   134/77 (!) 111 (!) 21 98.1 °F (36.7 °C) 98 %      MAP       --         Physical Exam    Nursing note and vitals reviewed.  Constitutional: She appears well-developed and well-nourished. She is sedated.   HENT:   Head: Head is with contusion.       Eyes: Conjunctivae are normal. Pupils are equal, round, and reactive to light.   Neck: Neck supple.   Cardiovascular:  Regular rhythm and intact distal pulses.   Tachycardia present.         Pulmonary/Chest: Breath sounds normal.   Abdominal: Abdomen is soft. Bowel sounds are normal.   Musculoskeletal:      Cervical back: Neck supple.     Neurological: She is unresponsive. GCS eye subscore is 2. GCS verbal subscore is 2. GCS motor subscore is 5.   Skin: Skin is warm. Abrasion noted.        Large abrasions across both legs consistent with road rash.       RESULTS:  Labs Reviewed   BASIC METABOLIC PANEL - Abnormal; Notable for the following components:       Result Value    Potassium Level 2.7 (*)     Chloride 109 (*)     Carbon Dioxide 18 (*)     Blood Urea Nitrogen 6.7 (*)     All other components within normal limits   URINALYSIS, REFLEX TO URINE CULTURE - Abnormal; Notable for the following components:    Color, UA Colorless (*)     Bacteria, UA Trace (*)     Squamous Epithelial Cells, UA Trace (*)     All other components within normal limits   CBC WITH DIFFERENTIAL - Abnormal; Notable for the following components:    RBC 3.47 (*)     Hgb 11.7 (*)     Hct 35.3 (*)     .7 (*)     MCH 33.7 (*)     All other components within normal limits   ALCOHOL,MEDICAL (ETHANOL) - Abnormal; Notable for the following components:    Ethanol Level 253.0 (*)     All other components within normal limits   CK - Abnormal; Notable for the following components:    Creatine Kinase 222 (*)     All other components within normal limits   CBC WITH DIFFERENTIAL -  Abnormal; Notable for the following components:    RBC 3.70 (*)     .4 (*)     MCH 33.8 (*)     All other components within normal limits   MAGNESIUM - Normal   TSH - Normal   T4, FREE - Normal   VITAMIN B12 - Normal   CBC W/ AUTO DIFFERENTIAL    Narrative:     The following orders were created for panel order CBC auto differential.  Procedure                               Abnormality         Status                     ---------                               -----------         ------                     CBC with Differential[[id number]]        Abnormal            Final result                 Please view results for these tests on the individual orders.   CBC W/ AUTO DIFFERENTIAL    Narrative:     The following orders were created for panel order CBC auto differential.  Procedure                               Abnormality         Status                     ---------                               -----------         ------                     CBC with Differential[[id number]]        Abnormal            Final result                 Please view results for these tests on the individual orders.   EXTRA TUBES    Narrative:     The following orders were created for panel order EXTRA TUBES.  Procedure                               Abnormality         Status                     ---------                               -----------         ------                     Light Blue Top Hold[[id number]]                              In process                   Please view results for these tests on the individual orders.   LIGHT BLUE TOP HOLD   FOLATE   POCT URINE PREGNANCY     Imaging Results              X-Ray Chest AP Portable (Final result)  Result time 05/06/23 22:36:00      Final result by Jason Armenta MD (05/06/23 22:36:00)                   Impression:      NO ACUTE CARDIOPULMONARY PROCESS IDENTIFIED.      Electronically signed by: Jason Armenta  Date:    05/06/2023  Time:    22:36               Narrative:     EXAMINATION:  XR CHEST AP PORTABLE    CLINICAL HISTORY:  trauma;    TECHNIQUE:  One    COMPARISON:  May 28, 2020.    FINDINGS:  Cardiopericardial silhouette is within normal limits.  No acute dense focal or segmental consolidation, congestive process, pleural effusions or pneumothorax.                                       CT Head Without Contrast (Preliminary result)  Result time 05/06/23 22:24:45      Preliminary result by Dudley Hernandez MD (05/06/23 22:24:45)                   Narrative:    START OF REPORT:  Technique: CT of the head was performed without intravenous contrast with axial as well as coronal and sagittal images.    Comparison: None.    Dosage Information: Automated exposure control was utilized.    Clinical history: Seizures (Pt brought by [facility], pt's mom reports pt was drinking etoh just prior to arrival and had two back to back seizures, mom reports pt was trying to roll down window in almost stopped car and accidentally hit the power button, pt fell out of car, pt has road rash two both legs, pt's mom uncertain if pt hit head. Vss. Pt takes keppra daily but missed today's dose, pt had 5 mg versed IV x 2 doses once prior to arrival and once in the lobby here.    Findings:  Hemorrhage: No acute intracranial hemorrhage is seen.  CSF spaces: The ventricles sulci and basal cisterns are within normal limits.  Brain parenchyma: There is preservation of the grey white junction throughout. No acute infarct is identified.  Cerebellum: Unremarkable.  Vascular: Unremarkable venous sinuses.  Sella and skull base: The sella appears to be within normal limits for age.  Cerebellopontine angles: Within normal limits.  Herniation: None.  Intracranial calcifications: Incidental note is made of bilateral choroid plexus calcification. Incidental note is made of some pineal region calcification.  Calvarium: No acute linear or depressed skull fracture is seen.    Maxillofacial Structures:  Paranasal sinuses: The  visualized paranasal sinuses appear clear with no mucoperiosteal thickening or air fluid levels identified.  Orbits: The orbits appear unremarkable.  Zygomatic arches: The zygomatic arches are intact and unremarkable.  Temporal bones and mastoids: The temporal bones and mastoids appear unremarkable.  TMJ: The mandibular condyles appear normally placed with respect to the mandibular fossa.  Nasal Bones: The nasal septum is midline.      Impression:  1. No acute intracranial process identified. Details and other findings as noted above.                                         CT Cervical Spine Without Contrast (Preliminary result)  Result time 05/06/23 22:25:16      Preliminary result by Dudley Hernandez MD (05/06/23 22:18:19)                   Narrative:    START OF REPORT:  Technique: CT of the cervical spine was performed without intravenous contrast with axial as well as sagittal and coronal images.    Comparison: None.    Dosage Information: Automated exposure control was utilized.    Clinical history: Seizures (Pt brought by [facility], pt's mom reports pt was drinking etoh just prior to arrival and had two back to back seizures, mom reports pt was trying to roll down window in almost stopped car and accidentally hit the power button, pt fell out of car, pt has road rash two both legs, pt's mom uncertain if pt hit head. Vss. Pt takes keppra daily but missed today's dose, pt had 5 mg versed IV x 2 doses once prior to arrival and once in the lobby here.    Findings:  Position: Supine.  Artifact: None.  Lung apices: The visualized lung apices appear unremarkable.  Spine:  Spinal canal: The spinal canal appears unremarkable.  Spinal cord: The spinal cord appears unremarkable.  Mineralization: Within normal limits.  Rotation: No significant rotation is seen.  Scoliosis: No significant scoliosis is seen.  Vertebral Fusion: No vertebral fusion is identified.  Listhesis: No significant listhesis is identified.  Lordosis:  Mild straightening of the cervical lordosis is seen. This may reflect an element of myospasm.  Intervertebral disc spaces: The intervertebral discs are preserved throughout.  Osteophytes: No significant osteophytes are seen in the cervical spine.  Endplate Sclerosis: No significant endplate sclerosis is seen.  Uncovertebral degenerative changes: No significant uncovertebral degenerative changes are seen.  Facet degenerative changes: No significant facet degenerative changes are seen.  Calcifications: None.  Fractures: No acute cervical spine fracture dislocation or subluxation is seen.  Orthopedic Hardware: None.    Miscellaneous:  Mastoid air cells: The visualized mastoid air cells appear clear.  Soft Tissues: Unremarkable.      Impression:  1. No acute cervical spine fracture dislocation or subluxation is seen.  2. Details and findings as noted above.                                        PROCEDURES:  Procedures    ECG:  EKG Readings: (Independently Interpreted)   Initial Reading: No STEMI. Rhythm: Sinus Tachycardia. Heart Rate: 108. Ectopy: No Ectopy. Axis: Normal.     ED COURSE AND MEDICAL DECISION MAKING:  Medications   Tdap (BOOSTRIX) vaccine injection 0.5 mL (has no administration in time range)   levETIRAcetam in NaCl (iso-os) (KEPPRA) 500 mg/100 mL IVPB (has no administration in time range)   lamoTRIgine tablet 50 mg (has no administration in time range)   levETIRAcetam tablet 500 mg (has no administration in time range)   propranoloL tablet 10 mg (has no administration in time range)   sodium chloride 0.9% flush 10 mL (has no administration in time range)   melatonin tablet 6 mg (has no administration in time range)   enoxaparin injection 40 mg (has no administration in time range)   famotidine (PF) injection 20 mg (has no administration in time range)   potassium chloride 10 mEq in 100 mL IVPB (has no administration in time range)   thiamine (B-1) 500 mg in dextrose 5 % (D5W) 100 mL IVPB (has no  administration in time range)     Followed by   thiamine tablet 100 mg (has no administration in time range)   multivitamin tablet (has no administration in time range)   folic acid tablet 1 mg (has no administration in time range)   LORazepam injection 1 mg (has no administration in time range)   LORazepam injection 2 mg (has no administration in time range)   lactated ringers bolus 1,000 mL (1,000 mLs Intravenous New Bag 5/7/23 0322)   levETIRAcetam in NaCl (iso-os) IVPB 1,000 mg (0 mg Intravenous Stopped 5/6/23 2256)   ondansetron injection 4 mg (4 mg Intravenous Given 5/6/23 2216)   0.9 % NaCl with KCl 20 mEq infusion ( Intravenous New Bag 5/7/23 0025)   LORazepam injection 2 mg (2 mg Intravenous Given 5/6/23 2351)   levETIRAcetam in NaCl (iso-os) IVPB 2,000 mg (0 mg Intravenous Stopped 5/7/23 0030)     ED Course as of 05/07/23 0511   Sat May 06, 2023   2351 Patient had another seizure, reportedly was more responsive just prior to this but does not sound like she was no longer post-ictal prior to onset of new seizure concerning for status epilepticus. Will give IV ativan along with another dose of keppra to equal a loading dose of just over 40 mg/kg. Will likely need to transfer if seizures persist, and needs to be intubated for airway protection and started on propofol. [IB]   Sun May 07, 2023   0008 Immediately after seizure activity terminated, patient was back to baseline without any post-ictal period. Review of medical records does indicate history of pseudo-seizures although there is one note that mentioned her having abnormal EEG spikes in the temporal lobe. I am unable to find neurology progress note to confirm whether she has epileptic seizures or just pseudo-seizures. Will continue to monitor for now. [IB]      ED Course User Index  [IB] Dajuan Blood, DO        Medical Decision Making  28-year-old female who presents via EMS with recurrent seizures, had received Versed 5 mg X 2 prior to ED arrival.   Patient had reportedly accidentally fallen out of the car sustaining road rash to her thighs and across her abdomen.  GCS 9 upon arrival.  She was placed in cervical collar and CT imaging was obtained of the head and C-spine which showed no evidence of acute traumatic injury.  She was given 1 g of Keppra IV initially however she had another seizure, so then she was given 2 mg IV Ativan and an additional 2 g of Keppra for a total loading dose of around 43 milligrams/kilogram.  Surprisingly there was no observable postictal period after her seizure in the emergency department.  Review of medical records does indicate history of pseudoseizures which mother reported as well however I did see 1 note that he would commented on having some abnormal temporal lobe spikes on EEG.  Labs significant for hypokalemia of 2.7 for which repletion was initiated with IV normal saline with 20 mEq KCL.  Given her recurrent seizures I feel that she warrants admission for further medical evaluation and treatment.  I have spoken with the patient and/or caregivers. I have explained the patient's condition, diagnoses and treatment plan based on the information available to me at this time. I have answered the patient's and/or caregiver's questions and addressed any concerns. The patient and/or caregivers have as good an understanding of the patient's diagnosis, condition and treatment plan as can be expected at this point. The patient has been stabilized within the capability of the emergency department. The patient will be transported for further care and management or will be moved to an observation or inpatient service. I have communicated with the staff or medical practitioner taking over this patient's care.    I have personally provided 40 minutes of critical care time exclusive of time spent on separately billable procedures. Time includes review of laboratory data, radiology results, discussion with consultants, and monitoring for  potential decompensation. Interventions were performed as documented above.     Amount and/or Complexity of Data Reviewed  External Data Reviewed: labs and notes.  Labs: ordered. Decision-making details documented in ED Course.  Radiology: ordered. Decision-making details documented in ED Course.  ECG/medicine tests: ordered and independent interpretation performed. Decision-making details documented in ED Course.    Risk  Parenteral controlled substances.  Decision regarding hospitalization.    Critical Care  Total time providing critical care: 40 minutes      CLINICAL IMPRESSION:  1. Recurrent seizures    2. Hypokalemia    3. Seizure    4. Tachycardia        DISPOSITION:   ED Disposition Condition    Observation Stable                    Dajuan Blood DO  05/07/23 0511

## 2023-05-07 NOTE — H&P
[facility] INTERNAL MEDICINE  ADMISSION HISTORY AND PHYSICAL    Resident Team: [facility] Medicine List 1  Attending Physician: Berry Cline MD    Date of Admit: 5/6/2023    SUBJECTIVE:      HPI: Lesly Culver is a 28 y.o. female with PMH of seizure (diagnosed in 2022), Hashimoto disease, depression, and anxiety, presented to the ED via EMS with a CC of seizure activities. Per patient's mother, she was drinking some hard liquor yesterday evening with her family but was unable to quantify how much she drank. Reportedly, she was a passenger in vehicle and had felt an incoming seizure, so she attempted to roll down the window but instead had opened the door and fell out of the car. Patient's mother also reported her having history of pseudo-seizures and most seizure activities occur during stressful events. She missed her daily keppra for the entire day yesterday because she wanted to drink alcoholic beverages with her family at a party. In the ED: tachycardia present; macrocytic anemia noted, K+ 2.7, anion gap acidosis (gap of 14). Per ED provider, patient was given Versed 5mg IV x2 prior to arrival and had one seizure activity while in the room that terminated after administration of Keppra and ativan 2mg IV x1. Patient is able to protect her airway and GCS was 9.    PMH: As stated above  Family HX: Mother (HTN)  Allergy: Tomato (hives)  Social HX: Denies tobacco use and illicit drug use; social drinker (once a month)  Previous hosp: ED visit on 11/14/2022 d/t seizure from alcohol intake (alcohol level 196.0)  Surgical HX: Tonsillectomy (2021)   Home meds:   Current Outpatient Medications   Medication Instructions    cetirizine (ZYRTEC) 10 mg, Oral, Daily    fluticasone propionate (FLONASE) 50 mcg, Each Nostril, Daily    hydrOXYzine pamoate (VISTARIL) 25 mg, Oral, Every 8 hours PRN    lamoTRIgine (LAMICTAL) 50 mg, Oral, Daily    levETIRAcetam (KEPPRA) 500 mg, Oral, 2 times daily    propranoloL (INDERAL) 10 mg, Oral, 3 times  daily     ROS:   Unable to assess (patient asleep d/t meds)     OBJECTIVE:     Vital signs:   /88   Pulse 110   Temp 98.1 °F (36.7 °C) (Temporal)   Resp 16   Wt 69.3 kg (152 lb 12.5 oz)   SpO2 96%   BMI 24.66 kg/m²      Physical Examination:  General: Well nourished  HEENT: NC/AT; PERRL; nasal and oral mucosa moist and clear; no presence of tongue bites; alopecia  Neck: Full ROM; no lymphadenopathy  Pulm: CTA bilaterally, normal work of breathing on room air  CV: S1, S2 w/o murmurs or gallops; no edema noted  GI: Soft with normal bowel sounds in all quadrants, no masses on palpation  MSK: No obvious deformities  Derm: Superficial abrasion on upper thighs bilaterally and left knee  Neuro: Unable to fully assess (patient asleep d/t meds)    Laboratory:  Most Recent Data:  CBC:   Lab Results   Component Value Date    WBC 5.38 05/06/2023    HGB 11.7 (L) 05/06/2023    HCT 35.3 (L) 05/06/2023     05/06/2023    .7 (H) 05/06/2023    RDW 12.2 05/06/2023     WBC Differential:   Recent Labs   Lab 05/06/23  2240   WBC 5.38   HGB 11.7*   HCT 35.3*      .7*     BMP:   Lab Results   Component Value Date     05/06/2023    K 2.7 (LL) 05/06/2023    CO2 18 (L) 05/06/2023    BUN 6.7 (L) 05/06/2023    CREATININE 0.66 05/06/2023    CALCIUM 9.5 05/06/2023    MG 2.10 05/06/2023    PHOS 2.7 11/02/2021     LFTs:   Lab Results   Component Value Date    ALBUMIN 4.3 11/14/2022    BILITOT 0.3 11/14/2022    AST 21 11/14/2022    ALKPHOS 39 (L) 11/14/2022    ALT 25 11/14/2022     Coags: No results found for: INR, PROTIME, PTT  FLP: No results found for: CHOL, HDL, LDLCALC, TRIG, CHOLHDL  DM:   Lab Results   Component Value Date    CREATININE 0.66 05/06/2023     Thyroid:   Lab Results   Component Value Date    TSH 2.9022 03/20/2022     Anemia:   Lab Results   Component Value Date    IBNVMDFQ05 535 11/01/2021    FOLATE 8.7 11/01/2021     Cardiac:   Lab Results   Component Value Date    TROPONINI <0.010  2022     Urinalysis:   Lab Results   Component Value Date    COLORU YELLOW 2022    PHUA 5.5 2023    NITRITE Negative 2022    KETONESU Negative 2022    UROBILINOGEN Normal 2023    WBCUA None Seen 2023     Imagin2023 - CXR revealed no acute cardiopulmonary abnormalities  2023 - CT cervical spine w/o contrast revealed no acute cervical spine fracture  2023 - CT head w/o contrast revealed no acute intracranial abnormalities    ASSESSMENT & PLAN:     Seizures 2/2 alcohol consumption  -No recent EEG but a note dated 3/22/2022 mentioned EEG noted left temporal spikes, may consider EEG on Monday (2023)  -Received Keppra 3g and ativan 2mg IV in the ED  -Alcohol level 253 and  on admission   -Started aspiration, seizure, and fall precautions  -Started CIWA protocol, thiamine IV, folic acid, and MVI  -Obtained UDS  -Continue Keppra 500mg BID and Lamictal 50mg daily  -Will perform bedside swallowing test when patient is alert and awake  -Per patient's mother, patient was unable to F/U with her previous Neurologist at [facility] d/t lack of insurance coverage. Will refer patient to [facility] Neurology clinic upon discharge    Electrolyte abnormalities  -K+ 2.7 on admission, will replete with KCL IV    Hashimoto disease  -TSH 7.082, free T4 0.84, TPO Ab 375 (2021)  -Thyroid U/S 2015 revealed borderline enlarged thyroid with diffuse heterogeneity and hypervascularity, suggesting underlying thyroiditis  -Patient is not on levothyroxine  -Rechecked TSH and free T4 levels    Macrocytic anemia  -.7 on admission  -Obtained folate and B12 levels    Depression  Anxiety  -Continue home Lamictal  -Continue to monitor symptoms    DVT PPx: Lovenox  GI PPx: Famotidine IV  Diet: NPO  ABX: None  Fluids: LR @ 100 mL/hr x 10 hours  O2: Room air  PCP: Sammi Mtz MD    Disposition (2023): Admitted patient to med/tele unit for ongoing monitoring and medical  therapy. Patient can be discharged home when medically stable.     Renny Cisneros DO   [facility], PGY-II

## 2023-05-07 NOTE — PT/OT/SLP PROGRESS
Physical Therapy    Missed Treatment Session    Patient Name:  Lesly Culver   MRN:  [id number]      Patient not seen at this time secondary to Bedrest.    Will follow-up as patient is available to participate and as therapists' schedule allows.

## 2023-05-07 NOTE — CARE UPDATE
Care update:    Patient was found to have acute weakness in her upper and lower extremities with impaired sensation. STAT CT head w/o contrast revealed no acute intracranial abnormalities. Examined patient at bedside with medicine team at a later time to find patient regaining some range of motion in her upper left extremities but weakness in left lower extremity persisted. She passed bedside swallowing test w/o any difficulty and was able communicate clearly verbally.    Plan:  -Consulted PT and OT teams for functional assessment and mobility training  -MRI brain and EEG on 5/8/2023  -Will also obtain TAPAN and thyroglobulin for basic auto-immune workup    Renny Cisneros, DO  [facility] PGY-II

## 2023-05-07 NOTE — PLAN OF CARE
Problem: Adult Inpatient Plan of Care  Goal: Plan of Care Review  Outcome: Ongoing, Progressing  Flowsheets (Taken 5/7/2023 0824)  Plan of Care Reviewed With: patient  Goal: Absence of Hospital-Acquired Illness or Injury  Outcome: Ongoing, Progressing  Intervention: Identify and Manage Fall Risk  Flowsheets (Taken 5/7/2023 0829)  Safety Promotion/Fall Prevention:   assistive device/personal item within reach   supervised activity   instructed to call staff for mobility  Intervention: Prevent Skin Injury  Flowsheets (Taken 5/7/2023 0829)  Body Position: position changed independently  Intervention: Prevent and Manage VTE (Venous Thromboembolism) Risk  Flowsheets (Taken 5/7/2023 0829)  Activity Management: Rolling - L1  Intervention: Prevent Infection  Flowsheets (Taken 5/7/2023 0829)  Infection Prevention:   rest/sleep promoted   single patient room provided   hand hygiene promoted  Goal: Optimal Comfort and Wellbeing  Outcome: Ongoing, Progressing  Intervention: Monitor Pain and Promote Comfort  Flowsheets (Taken 5/7/2023 0833)  Pain Management Interventions:   around-the-clock dosing utilized   quiet environment facilitated   relaxation techniques promoted  Intervention: Provide Person-Centered Care  Flowsheets (Taken 5/7/2023 0833)  Trust Relationship/Rapport:   care explained   questions encouraged   thoughts/feelings acknowledged   emotional support provided   choices provided   reassurance provided   empathic listening provided

## 2023-05-08 ENCOUNTER — DOCUMENTATION ONLY (OUTPATIENT)
Dept: PHARMACY | Facility: HOSPITAL | Age: 28
End: 2023-05-08
Payer: MEDICAID

## 2023-05-08 VITALS
TEMPERATURE: 99 F | BODY MASS INDEX: 27.78 KG/M2 | SYSTOLIC BLOOD PRESSURE: 106 MMHG | RESPIRATION RATE: 16 BRPM | WEIGHT: 177 LBS | HEIGHT: 67 IN | OXYGEN SATURATION: 97 % | HEART RATE: 92 BPM | DIASTOLIC BLOOD PRESSURE: 70 MMHG

## 2023-05-08 PROBLEM — G40.909 RECURRENT SEIZURES: Status: ACTIVE | Noted: 2023-05-08

## 2023-05-08 LAB
ALBUMIN SERPL-MCNC: 3.5 G/DL (ref 3.5–5)
ALBUMIN/GLOB SERPL: 1.1 RATIO (ref 1.1–2)
ALP SERPL-CCNC: 38 UNIT/L (ref 40–150)
ALT SERPL-CCNC: 18 UNIT/L (ref 0–55)
ANION GAP SERPL CALC-SCNC: 9 MEQ/L
AST SERPL-CCNC: 19 UNIT/L (ref 5–34)
BASOPHILS # BLD AUTO: 0.02 X10(3)/MCL
BASOPHILS NFR BLD AUTO: 0.3 %
BILIRUBIN DIRECT+TOT PNL SERPL-MCNC: 0.4 MG/DL
BUN SERPL-MCNC: 7.8 MG/DL (ref 7–18.7)
BUN SERPL-MCNC: 8 MG/DL (ref 7–18.7)
CALCIUM SERPL-MCNC: 8.7 MG/DL (ref 8.4–10.2)
CALCIUM SERPL-MCNC: 8.7 MG/DL (ref 8.4–10.2)
CHLORIDE SERPL-SCNC: 104 MMOL/L (ref 98–107)
CHLORIDE SERPL-SCNC: 105 MMOL/L (ref 98–107)
CO2 SERPL-SCNC: 22 MMOL/L (ref 22–29)
CO2 SERPL-SCNC: 22 MMOL/L (ref 22–29)
CREAT SERPL-MCNC: 0.7 MG/DL (ref 0.55–1.02)
CREAT SERPL-MCNC: 0.7 MG/DL (ref 0.55–1.02)
CREAT/UREA NIT SERPL: 11
EOSINOPHIL # BLD AUTO: 0.01 X10(3)/MCL (ref 0–0.9)
EOSINOPHIL NFR BLD AUTO: 0.2 %
ERYTHROCYTE [DISTWIDTH] IN BLOOD BY AUTOMATED COUNT: 12.6 % (ref 11.5–17)
GFR SERPLBLD CREATININE-BSD FMLA CKD-EPI: >60 MLS/MIN/1.73/M2
GFR SERPLBLD CREATININE-BSD FMLA CKD-EPI: >60 MLS/MIN/1.73/M2
GLOBULIN SER-MCNC: 3.2 GM/DL (ref 2.4–3.5)
GLUCOSE SERPL-MCNC: 110 MG/DL (ref 74–100)
GLUCOSE SERPL-MCNC: 111 MG/DL (ref 74–100)
HCT VFR BLD AUTO: 32.8 % (ref 37–47)
HGB BLD-MCNC: 11.3 G/DL (ref 12–16)
IMM GRANULOCYTES # BLD AUTO: 0.01 X10(3)/MCL (ref 0–0.04)
IMM GRANULOCYTES NFR BLD AUTO: 0.2 %
LYMPHOCYTES # BLD AUTO: 2.06 X10(3)/MCL (ref 0.6–4.6)
LYMPHOCYTES NFR BLD AUTO: 32.1 %
MAGNESIUM SERPL-MCNC: 1.6 MG/DL (ref 1.6–2.6)
MCH RBC QN AUTO: 34.8 PG (ref 27–31)
MCHC RBC AUTO-ENTMCNC: 34.5 G/DL (ref 33–36)
MCV RBC AUTO: 100.9 FL (ref 80–94)
MONOCYTES # BLD AUTO: 0.7 X10(3)/MCL (ref 0.1–1.3)
MONOCYTES NFR BLD AUTO: 10.9 %
NEUTROPHILS # BLD AUTO: 3.61 X10(3)/MCL (ref 2.1–9.2)
NEUTROPHILS NFR BLD AUTO: 56.3 %
NRBC BLD AUTO-RTO: 0 %
PHOSPHATE SERPL-MCNC: 2.8 MG/DL (ref 2.3–4.7)
PLATELET # BLD AUTO: 186 X10(3)/MCL (ref 130–400)
PMV BLD AUTO: 10.1 FL (ref 7.4–10.4)
POTASSIUM SERPL-SCNC: 3.4 MMOL/L (ref 3.5–5.1)
POTASSIUM SERPL-SCNC: 3.4 MMOL/L (ref 3.5–5.1)
PROT SERPL-MCNC: 6.7 GM/DL (ref 6.4–8.3)
RBC # BLD AUTO: 3.25 X10(6)/MCL (ref 4.2–5.4)
SODIUM SERPL-SCNC: 134 MMOL/L (ref 136–145)
SODIUM SERPL-SCNC: 135 MMOL/L (ref 136–145)
WBC # SPEC AUTO: 6.41 X10(3)/MCL (ref 4.5–11.5)

## 2023-05-08 PROCEDURE — 96368 THER/DIAG CONCURRENT INF: CPT

## 2023-05-08 PROCEDURE — 96376 TX/PRO/DX INJ SAME DRUG ADON: CPT

## 2023-05-08 PROCEDURE — 86039 ANTINUCLEAR ANTIBODIES (ANA): CPT | Mod: 90 | Performed by: STUDENT IN AN ORGANIZED HEALTH CARE EDUCATION/TRAINING PROGRAM

## 2023-05-08 PROCEDURE — 63600175 PHARM REV CODE 636 W HCPCS: Performed by: STUDENT IN AN ORGANIZED HEALTH CARE EDUCATION/TRAINING PROGRAM

## 2023-05-08 PROCEDURE — 25000003 PHARM REV CODE 250

## 2023-05-08 PROCEDURE — 95819 EEG AWAKE AND ASLEEP: CPT | Mod: 26,,, | Performed by: PSYCHIATRY & NEUROLOGY

## 2023-05-08 PROCEDURE — 97166 OT EVAL MOD COMPLEX 45 MIN: CPT

## 2023-05-08 PROCEDURE — 84432 ASSAY OF THYROGLOBULIN: CPT | Performed by: STUDENT IN AN ORGANIZED HEALTH CARE EDUCATION/TRAINING PROGRAM

## 2023-05-08 PROCEDURE — G0378 HOSPITAL OBSERVATION PER HR: HCPCS

## 2023-05-08 PROCEDURE — 96366 THER/PROPH/DIAG IV INF ADDON: CPT

## 2023-05-08 PROCEDURE — 94761 N-INVAS EAR/PLS OXIMETRY MLT: CPT

## 2023-05-08 PROCEDURE — 96367 TX/PROPH/DG ADDL SEQ IV INF: CPT

## 2023-05-08 PROCEDURE — 96375 TX/PRO/DX INJ NEW DRUG ADDON: CPT

## 2023-05-08 PROCEDURE — 97162 PT EVAL MOD COMPLEX 30 MIN: CPT

## 2023-05-08 PROCEDURE — 95819 PR EEG,W/AWAKE & ASLEEP RECORD: ICD-10-PCS | Mod: 26,,, | Performed by: PSYCHIATRY & NEUROLOGY

## 2023-05-08 PROCEDURE — 25000003 PHARM REV CODE 250: Performed by: STUDENT IN AN ORGANIZED HEALTH CARE EDUCATION/TRAINING PROGRAM

## 2023-05-08 PROCEDURE — 85025 COMPLETE CBC W/AUTO DIFF WBC: CPT | Performed by: STUDENT IN AN ORGANIZED HEALTH CARE EDUCATION/TRAINING PROGRAM

## 2023-05-08 PROCEDURE — 80053 COMPREHEN METABOLIC PANEL: CPT | Performed by: STUDENT IN AN ORGANIZED HEALTH CARE EDUCATION/TRAINING PROGRAM

## 2023-05-08 PROCEDURE — 96374 THER/PROPH/DIAG INJ IV PUSH: CPT | Mod: 59

## 2023-05-08 PROCEDURE — 95816 EEG AWAKE AND DROWSY: CPT

## 2023-05-08 RX ORDER — LEVETIRACETAM 500 MG/1
500 TABLET ORAL 2 TIMES DAILY
Qty: 60 TABLET | Refills: 0 | Status: SHIPPED | OUTPATIENT
Start: 2023-05-08 | End: 2023-06-07

## 2023-05-08 RX ORDER — MUPIROCIN 20 MG/G
OINTMENT TOPICAL DAILY
Qty: 30 G | Refills: 1 | Status: SHIPPED | OUTPATIENT
Start: 2023-05-09

## 2023-05-08 RX ORDER — METHOCARBAMOL 500 MG/1
500 TABLET, FILM COATED ORAL 3 TIMES DAILY
Status: DISCONTINUED | OUTPATIENT
Start: 2023-05-08 | End: 2023-05-08 | Stop reason: HOSPADM

## 2023-05-08 RX ORDER — LAMOTRIGINE 25 MG/1
50 TABLET ORAL DAILY
Qty: 60 TABLET | Refills: 0 | Status: SHIPPED | OUTPATIENT
Start: 2023-05-08 | End: 2023-06-07

## 2023-05-08 RX ORDER — ONDANSETRON 4 MG/1
8 TABLET, ORALLY DISINTEGRATING ORAL ONCE
Status: COMPLETED | OUTPATIENT
Start: 2023-05-08 | End: 2023-05-08

## 2023-05-08 RX ORDER — METHOCARBAMOL 500 MG/1
500 TABLET, FILM COATED ORAL 3 TIMES DAILY
Qty: 45 TABLET | Refills: 0 | Status: SHIPPED | OUTPATIENT
Start: 2023-05-08 | End: 2023-05-23

## 2023-05-08 RX ORDER — ACETAMINOPHEN 325 MG/1
650 TABLET ORAL EVERY 6 HOURS
Status: DISCONTINUED | OUTPATIENT
Start: 2023-05-08 | End: 2023-05-08 | Stop reason: HOSPADM

## 2023-05-08 RX ORDER — HYDROCODONE BITARTRATE AND ACETAMINOPHEN 5; 325 MG/1; MG/1
1 TABLET ORAL EVERY 6 HOURS PRN
Qty: 10 TABLET | Refills: 0 | Status: SHIPPED | OUTPATIENT
Start: 2023-05-08

## 2023-05-08 RX ORDER — MAGNESIUM SULFATE HEPTAHYDRATE 40 MG/ML
2 INJECTION, SOLUTION INTRAVENOUS ONCE
Status: COMPLETED | OUTPATIENT
Start: 2023-05-08 | End: 2023-05-08

## 2023-05-08 RX ORDER — ACETAMINOPHEN 325 MG/1
650 TABLET ORAL EVERY 6 HOURS
Qty: 240 TABLET | Refills: 0 | Status: SHIPPED | OUTPATIENT
Start: 2023-05-08 | End: 2023-06-07

## 2023-05-08 RX ORDER — MUPIROCIN 20 MG/G
OINTMENT TOPICAL DAILY
Status: DISCONTINUED | OUTPATIENT
Start: 2023-05-08 | End: 2023-05-08 | Stop reason: HOSPADM

## 2023-05-08 RX ORDER — POLYETHYLENE GLYCOL 3350 17 G/17G
17 POWDER, FOR SOLUTION ORAL DAILY PRN
Qty: 10 EACH | Refills: 0 | Status: SHIPPED | OUTPATIENT
Start: 2023-05-08

## 2023-05-08 RX ORDER — FOLIC ACID 1 MG/1
1 TABLET ORAL DAILY
Qty: 30 TABLET | Refills: 0 | Status: SHIPPED | OUTPATIENT
Start: 2023-05-09 | End: 2023-06-08

## 2023-05-08 RX ADMIN — PROPRANOLOL HYDROCHLORIDE 10 MG: 10 TABLET ORAL at 02:05

## 2023-05-08 RX ADMIN — THIAMINE HYDROCHLORIDE 500 MG: 100 INJECTION, SOLUTION INTRAMUSCULAR; INTRAVENOUS at 02:05

## 2023-05-08 RX ADMIN — METHOCARBAMOL 500 MG: 500 TABLET ORAL at 02:05

## 2023-05-08 RX ADMIN — KETOROLAC TROMETHAMINE 15 MG: 30 INJECTION, SOLUTION INTRAMUSCULAR; INTRAVENOUS at 04:05

## 2023-05-08 RX ADMIN — THERA TABS 1 TABLET: TAB at 10:05

## 2023-05-08 RX ADMIN — LAMOTRIGINE 50 MG: 25 TABLET ORAL at 10:05

## 2023-05-08 RX ADMIN — WHITE PETROLATUM: 1.75 OINTMENT TOPICAL at 07:05

## 2023-05-08 RX ADMIN — MUPIROCIN: 20 OINTMENT TOPICAL at 07:05

## 2023-05-08 RX ADMIN — FAMOTIDINE 20 MG: 10 INJECTION, SOLUTION INTRAVENOUS at 10:05

## 2023-05-08 RX ADMIN — THIAMINE HYDROCHLORIDE 500 MG: 100 INJECTION, SOLUTION INTRAMUSCULAR; INTRAVENOUS at 06:05

## 2023-05-08 RX ADMIN — FOLIC ACID 1 MG: 1 TABLET ORAL at 10:05

## 2023-05-08 RX ADMIN — HYDROMORPHONE HYDROCHLORIDE 0.2 MG: 1 INJECTION, SOLUTION INTRAMUSCULAR; INTRAVENOUS; SUBCUTANEOUS at 10:05

## 2023-05-08 RX ADMIN — ONDANSETRON 8 MG: 4 TABLET, ORALLY DISINTEGRATING ORAL at 11:05

## 2023-05-08 RX ADMIN — MAGNESIUM SULFATE HEPTAHYDRATE 2 G: 40 INJECTION, SOLUTION INTRAVENOUS at 05:05

## 2023-05-08 RX ADMIN — ACETAMINOPHEN 650 MG: 325 TABLET ORAL at 11:05

## 2023-05-08 RX ADMIN — POTASSIUM BICARBONATE 40 MEQ: 391 TABLET, EFFERVESCENT ORAL at 10:05

## 2023-05-08 RX ADMIN — PROPRANOLOL HYDROCHLORIDE 10 MG: 10 TABLET ORAL at 10:05

## 2023-05-08 RX ADMIN — LEVETIRACETAM 500 MG: 500 TABLET, FILM COATED ORAL at 11:05

## 2023-05-08 NOTE — PLAN OF CARE
Problem: Physical Therapy  Goal: Physical Therapy Goal  Description: Goals to be met by: dc     Patient will increase functional independence with mobility by performin. Supine to sit with Modified [place]  2. Sit to stand transfer with Modified [place]  3. Gait  x 130 feet with Modified [place] using Rolling Walker.     Outcome: Ongoing, Progressing

## 2023-05-08 NOTE — PT/OT/SLP EVAL
Occupational Therapy   Evaluation and Discharge Note    Name: Lesly Culver  MRN: [id number]  Admitting Diagnosis: Seizure  Patient Active Problem List   Diagnosis    Seizure    Recurrent seizures       Recent Surgery: * No surgery found *      Recommendations:     Discharge Recommendations: home, other (see comments) (family to A as needed)  Discharge Equipment Recommendations: other (see comments) (possibly RW)  Barriers to discharge:  None    Assessment:     Lesly Culver is a 28 y.o. female with a medical diagnosis of Seizure  Patient Active Problem List   Diagnosis    Seizure    Recurrent seizures     . At this time, patient is functioning at their prior level of function and does not require further acute OT services.     Plan:     During this hospitalization, patient does not require further acute OT services.  Please re-consult if situation changes.    Plan of Care Reviewed with: patient, mother    Subjective     Chief Complaint: pain  Patient/Family Comments/goals: none stated    Occupational Profile:  Living Environment: Pt reported she lives with her mom and grandmother in a 2 story home with 16 steps inside.  Pt's mom stated pt will be staying downstairs upon d/c.  Bathroom pt will use has a stand up shower.  Previous level of function: Pt reported she does drive, performed all self care skills on her own, meal prep indep as well as home making tasks prior to this admit.  Roles and Routines: Pt doesn't work.  Equipment Used at home: none  Assistance upon Discharge: family care as needed    Pain/Comfort:  Pain Rating 1: 8/10  Location 1:  (BLE wounds)  Pain Addressed 1: Nurse notified, Reposition  Pain Rating Post-Intervention 1: 7/10    Patients cultural, spiritual, [employer] conflicts given the current situation: no    Objective:     Communicated with: nurse Su prior to session.  Patient found supine with PureWick, telemetry, peripheral IV upon OT entry to room.    General Precautions: Standard,  (OT eval  completed.  OT not warranted at this time, no goals set.)  Orthopedic Precautions: N/A  Braces: N/A  Respiratory Status: Room air     Occupational Performance:    Bed Mobility:    Patient completed Scooting/Bridging with modified independence, slow with all movements.  Patient completed Supine to Sit with modified independence, slow with all movements.    Functional Mobility/Transfers:  Patient completed Sit <> Stand Transfer with modified independence  with  rolling walker   Functional Mobility: pt ambulated supervision with use of RW, no LOB, performed slowly secondary to pain.    Activities of Daily Living:  Pt reported her mom will A with all needed tasks upon d/c.    Cognitive/Visual Perceptual:  Cognitive/Psychosocial Skills:     -       Oriented to: Person, Place, Time, and Situation   -       Safety awareness/insight to disability: intact     Physical Exam:  Sensation:  intact to BUE  Dominant hand:  R  Upper Extremity Strength:  WNL BUE  Fine Motor Coordination:  WNL for BUE thumb opposition    Treatment & Education:  Pt. educated on OT goals, POC, orientation to environment, use of call bell for assist with transfers OOB or for any other needs.      Patient left supine with all lines intact, call button in reach, nurse notified, and pt's mom present    GOALS:   Multidisciplinary Problems       Occupational Therapy Goals       OT eval completed.  OT not warranted at this time, no goals set.                    History:     Past Medical History:   Diagnosis Date    Heart valve problem     Seizures     Thyroid disease          Past Surgical History:   Procedure Laterality Date    CHOLECYSTECTOMY         Time Tracking:     OT Date of Treatment: 05/08/23  OT Start Time: 1044  OT Stop Time: 1110  OT Total Time (min): 26 min    Billable Minutes:Evaluation 26 min    5/8/2023

## 2023-05-08 NOTE — PROGRESS NOTES
[facility] filled methocarbamol, mupirocin, lamictol, folic acid, norco and keppra and delivered to bedside. The tylenol, miralax, multivitamin, colace and aquaphor are over the counter.

## 2023-05-08 NOTE — DISCHARGE INSTRUCTIONS
Daily wound care to legs and back-OK to shower with baby shampoo, no bathing/soaking  Get into shower with current dressings to wet them to come off easier. Rinse wounds, don't scrub  Get out the shower and clean with Vashe solution then pat dry  Apply thick layer of aquaphor then Mupirocin ointment to yellow Xeroform dressings then apply to all open asecpts. (Both thighs, both knees, right lower back)  Use gauze or abdominal pads for outer dressing and secure with wide medfix tape

## 2023-05-08 NOTE — PT/OT/SLP PROGRESS
Physical Therapy    Missed Treatment Session    Patient Name:  Lesly Culver   MRN:  [id number]      Patient not seen at this time secondary to Testing/imaging (xray/CT/MRI).     Will follow-up as patient is available to participate and as therapists' schedule allows.
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
## 2023-05-08 NOTE — PT/OT/SLP PROGRESS
Occupational Therapy      Patient Name:  Lesly Culver   MRN:  [id number]    OT attempted to see pt for eval.  Pt leaving unit for testing.  OT will attempt as schedule permits and pt abiel.    5/8/2023
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
## 2023-05-08 NOTE — PROCEDURES
EEG    Dx: Seizure    Duration: 32 minutes    Technical: Standard digital EEG was performed at [facility]. The 10/20 international system of electrode placement was used.  Photic   stimulation and hyperventilation were performed as activating   procedures.    Description: The posterior dominant rhythm was 9 Hz.  There   were no lateralizing features.  The patient achieved stage 2 sleep.  There were   no epileptiform discharges or clinical seizures seen.    Impression: Normal   awake and asleep EEG.

## 2023-05-09 LAB — THYROGLOB AB SERPL IA-ACNC: 24 IU/ML

## 2023-05-09 NOTE — PT/OT/SLP DISCHARGE
POST DISCHARGE DOCUMENTATION - 2023 1:42 PM    Physical Therapy Discharge Summary    Name: Lesly Culver  MRN: [id number]   Principal Problem: Seizure       Recommendations - per last treatment session     Discharge Recommendations:  home   Discharge Equipment Recommendations: none     Assessment:     Refer to prior Physical Therapy note dated 2023 for last known functional status of patient.    Patient was unexpectedly discharged from hospital.  Refer to therapy's initial evaluation or last treatment note for patient's most recent functional status and goal achievement and therapists' recommendations.    Objective     GOALS:  Multidisciplinary Problems       Physical Therapy Goals          Problem: Physical Therapy    Goal Priority Disciplines Outcome Goal Variances Interventions   Physical Therapy Goal     PT, PT/OT Ongoing, Progressing     Description: Goals to be met by: dc     Patient will increase functional independence with mobility by performin. Supine to sit with Modified [place]  2. Sit to stand transfer with Modified [place]  3. Gait  x 130 feet with Modified [place] using Rolling Walker.                          Plan     Patient Discharged to: with Home Health PT  per chart.    2023

## 2023-05-10 ENCOUNTER — HOSPITAL ENCOUNTER (OUTPATIENT)
Dept: WOUND CARE | Facility: HOSPITAL | Age: 28
Discharge: HOME | End: 2023-05-10
Attending: NURSE PRACTITIONER
Payer: MEDICAID

## 2023-05-10 VITALS
SYSTOLIC BLOOD PRESSURE: 121 MMHG | RESPIRATION RATE: 18 BRPM | DIASTOLIC BLOOD PRESSURE: 80 MMHG | OXYGEN SATURATION: 97 % | HEART RATE: 109 BPM

## 2023-05-10 DIAGNOSIS — S80.811A ABRASION, RIGHT LOWER LEG, INITIAL ENCOUNTER: ICD-10-CM

## 2023-05-10 DIAGNOSIS — S80.812A ABRASION OF LEFT LOWER LEG, INITIAL ENCOUNTER: ICD-10-CM

## 2023-05-10 DIAGNOSIS — S80.212A ABRASION OF KNEE, LEFT, INITIAL ENCOUNTER: ICD-10-CM

## 2023-05-10 DIAGNOSIS — S30.811A ABRASION OF ABDOMINAL WALL, INITIAL ENCOUNTER: Primary | ICD-10-CM

## 2023-05-10 DIAGNOSIS — R56.9 SEIZURES: ICD-10-CM

## 2023-05-10 DIAGNOSIS — S80.211A ABRASION OF KNEE, RIGHT, INITIAL ENCOUNTER: ICD-10-CM

## 2023-05-10 DIAGNOSIS — R11.0 NAUSEA: ICD-10-CM

## 2023-05-10 LAB
AR ANA INTERPRETIVE COMMENT: NORMAL
AR ANTINUCLEAR ANTIBODY (ANA), HEP-2, IGG: NORMAL

## 2023-05-10 PROCEDURE — 99204 OFFICE O/P NEW MOD 45 MIN: CPT | Mod: ,,, | Performed by: NURSE PRACTITIONER

## 2023-05-10 PROCEDURE — 99204 PR OFFICE/OUTPT VISIT, NEW, LEVL IV, 45-59 MIN: ICD-10-PCS | Mod: ,,, | Performed by: NURSE PRACTITIONER

## 2023-05-10 PROCEDURE — 99211 OFF/OP EST MAY X REQ PHY/QHP: CPT

## 2023-05-10 RX ORDER — ONDANSETRON 4 MG/1
8 TABLET, ORALLY DISINTEGRATING ORAL EVERY 8 HOURS PRN
Qty: 30 TABLET | Refills: 0 | Status: SHIPPED | OUTPATIENT
Start: 2023-05-10

## 2023-05-10 NOTE — PATIENT INSTRUCTIONS
Shower every other day with baby shampoo, rinse with water. Perform wound care cleanse all wounds with Vashe, apply xeroform dressings to all wounds, wrap with Kerlix on  bilateral knees use use foam boarder dressing on right side, left and right thighs.

## 2023-05-11 PROBLEM — R11.0 NAUSEA: Status: ACTIVE | Noted: 2023-05-11

## 2023-05-11 PROBLEM — S80.811A ABRASION, RIGHT LOWER LEG, INITIAL ENCOUNTER: Status: ACTIVE | Noted: 2023-05-11

## 2023-05-11 PROBLEM — S30.811A ABRASION OF ABDOMINAL WALL: Status: ACTIVE | Noted: 2023-05-11

## 2023-05-11 PROBLEM — S80.812A ABRASION OF LEFT LOWER LEG: Status: ACTIVE | Noted: 2023-05-11

## 2023-05-11 PROBLEM — S80.211A: Status: ACTIVE | Noted: 2023-05-11

## 2023-05-11 PROBLEM — S80.212A: Status: ACTIVE | Noted: 2023-05-11

## 2023-05-12 NOTE — PROGRESS NOTES
Subjective:       Patient ID: Lesly Culver is a 28 y.o. female.    Chief Complaint: Wound Consult    28-year-old female presents to wound care clinic today with mother as a referral from recent inpatient stay.  On 05/06/2023 patient presents to the ER after having a seizure while mother was [place] [employer] patient fell out of the car obtaining multiple abrasions to abdomen, bilateral lower extremities, and knee.  Patient reports nausea with pain medication.  Medical history:  Seizures, thyroid, heart valve disorder,and history of ETOH use.    Today's visit 05/10/2023:  Multiple abrasions bilateral abdomen legs, and knees red granulating wound beds with minimal serous drainage.  Mother assisting with wound care.  Discussion with the patient and mother will change wound care.  Will her start washing with baby shampoo rinse dry gently cleansed all abrasions with Vashe, applied Xeroform gauze, and foam border dressings with the exception of knees wrapped with Kerlix and secure with Tubigrip size F.  Instructed perform wound care every other day.  Nursing staff performed all wound care bedside.  Supplies given and border.  Prescribed Zofran for nausea.  Will have her return in 1 week.  Instructed the patient and mother to call the office with any questions, concerns, or new skin issues.  Verbal as understanding of all instructions.    Lab Results   Component Value Date/Time    WBC 6.41 05/08/2023 05:19 AM    RBC 3.25 (L) 05/08/2023 05:19 AM    HGB 11.3 (L) 05/08/2023 05:19 AM    HCT 32.8 (L) 05/08/2023 05:19 AM    .9 (H) 05/08/2023 05:19 AM    MCH 34.8 (H) 05/08/2023 05:19 AM    CREATININE 0.70 05/08/2023 05:19 AM     Review of Systems   Skin:  Positive for wound.   All other systems reviewed and are negative.        Objective:        Physical Exam  Vitals reviewed.   Skin:     General: Skin is warm and dry.      Capillary Refill: Capillary refill takes less than 2 seconds.      Findings: Abrasion present.              Comments: Multiple abrasions bilateral abdomen,leg, and knees red granulating wound bed with minimal serous drainage.   Neurological:      Mental Status: She is alert.          Incision/Site 05/10/23 0954 Right Knee anterior (Active)   05/10/23 0954   Present Prior to Hospital Arrival?:    Side: Right   Location: Knee   Orientation: anterior   Incision Type:    Closure Method:    Additional Comments:    Removal Indication and Assessment:    Wound Outcome:    Removal Indications:    Wound Image   05/10/23 0954   Appearance Red 05/10/23 0954   Wound Length (cm) 1.5 cm 05/10/23 0954   Wound Width (cm) 2.5 cm 05/10/23 0954   Wound Depth (cm) 0.1 cm 05/10/23 0954   Wound Volume (cm^3) 0.375 cm^3 05/10/23 0954   Wound Surface Area (cm^2) 3.75 cm^2 05/10/23 0954            Incision/Site 05/10/23 0955 Right Abdomen lateral (Active)   05/10/23 0955   Present Prior to Hospital Arrival?:    Side: Right   Location: Abdomen   Orientation: lateral   Incision Type:    Closure Method:    Additional Comments:    Removal Indication and Assessment:    Wound Outcome:    Removal Indications:    Wound Image   05/10/23 0954   Drainage Amount Moderate 05/10/23 0954   Drainage Characteristics/Odor Serosanguineous 05/10/23 0954   Appearance Red 05/10/23 0954   Wound Length (cm) 4 cm 05/10/23 0954   Wound Width (cm) 5 cm 05/10/23 0954   Wound Depth (cm) 0.1 cm 05/10/23 0954   Wound Volume (cm^3) 2 cm^3 05/10/23 0954   Wound Surface Area (cm^2) 20 cm^2 05/10/23 0954            Incision/Site 05/10/23 0956 Right Thigh upper (Active)   05/10/23 0956   Present Prior to Hospital Arrival?:    Side: Right   Location: Thigh   Orientation: upper   Incision Type:    Closure Method:    Additional Comments:    Removal Indication and Assessment:    Wound Outcome:    Removal Indications:    Wound Image   05/10/23 0954   Drainage Amount Moderate 05/10/23 0954   Drainage Characteristics/Odor Serosanguineous 05/10/23 0954   Appearance Red 05/10/23 0954    Wound Length (cm) 15 cm 05/10/23 0954   Wound Width (cm) 11.5 cm 05/10/23 0954   Wound Depth (cm) 0.1 cm 05/10/23 0954   Wound Volume (cm^3) 17.25 cm^3 05/10/23 0954   Wound Surface Area (cm^2) 172.5 cm^2 05/10/23 0954            Incision/Site 05/10/23 0956 Left Knee (Active)   05/10/23 0956   Present Prior to Hospital Arrival?:    Side: Left   Location: Knee   Orientation:    Incision Type:    Closure Method:    Additional Comments:    Removal Indication and Assessment:    Wound Outcome:    Removal Indications:    Wound Image   05/10/23 0954   Drainage Amount Moderate 05/10/23 0954   Drainage Characteristics/Odor Serosanguineous 05/10/23 0954   Appearance Red 05/10/23 0954   Wound Length (cm) 2 cm 05/10/23 0954   Wound Width (cm) 2.5 cm 05/10/23 0954   Wound Depth (cm) 0.1 cm 05/10/23 0954   Wound Volume (cm^3) 0.5 cm^3 05/10/23 0954   Wound Surface Area (cm^2) 5 cm^2 05/10/23 0954            Incision/Site 05/10/23 0957 Left Thigh upper (Active)   05/10/23 0957   Present Prior to Hospital Arrival?:    Side: Left   Location: Thigh   Orientation: upper   Incision Type:    Closure Method:    Additional Comments:    Removal Indication and Assessment:    Wound Outcome:    Removal Indications:    Wound Image   05/10/23 0954   Drainage Amount Moderate 05/10/23 0954   Drainage Characteristics/Odor Serosanguineous 05/10/23 0954   Appearance Red 05/10/23 0954   Wound Length (cm) 18 cm 05/10/23 0954   Wound Width (cm) 11 cm 05/10/23 0954   Wound Depth (cm) 0.1 cm 05/10/23 0954   Wound Volume (cm^3) 19.8 cm^3 05/10/23 0954   Wound Surface Area (cm^2) 198 cm^2 05/10/23 0954            Incision/Site 05/10/23 0958 Right Leg lateral (Active)   05/10/23 0958   Present Prior to Hospital Arrival?:    Side: Right   Location: Leg   Orientation: lateral   Incision Type:    Closure Method:    Additional Comments:    Removal Indication and Assessment:    Wound Outcome:    Removal Indications:    Drainage Amount Moderate 05/10/23 0948    Drainage Characteristics/Odor Serosanguineous 05/10/23 0954   Appearance Red 05/10/23 0954   Wound Length (cm) 5.5 cm 05/10/23 0954   Wound Width (cm) 4 cm 05/10/23 0954   Wound Depth (cm) 0.1 cm 05/10/23 0954   Wound Volume (cm^3) 2.2 cm^3 05/10/23 0954   Wound Surface Area (cm^2) 22 cm^2 05/10/23 0954         Assessment:         ICD-10-CM ICD-9-CM   1. Abrasion of abdominal wall, initial encounter  S30.811A 911.0   2. Abrasion of left lower leg, initial encounter  S80.812A 916.0   3. Abrasion, right lower leg, initial encounter  S80.811A 916.0   4. Abrasion of knee, right, initial encounter  S80.211A 916.0   5. Abrasion of knee, left, initial encounter  S80.212A 916.0   6. Nausea  R11.0 787.02   7. Seizures  R56.9 780.39         Plan:   Tissue pathology and/or culture taken:  [] Yes [x] No   Sharp debridement performed:   [] Yes [x] No   Labs ordered this visit:   [] Yes [x] No   Imaging ordered this visit:   [] Yes [x] No         1. Abrasion of abdominal wall, initial encounter     Instructed on wound care:  Wash with baby shampoo rinse dry gently cleansed all abrasions with Vashe, applied Xeroform gauze, and foam border dressings with the exception of knees wrapped with Kerlix and secure with Tubigrip size F.  Wound care may be performed every other day.     2. Abrasion of left lower leg, initial encounter     Wash with baby shampoo rinse dry gently cleansed all abrasions with Vashe, applied Xeroform gauze, and foam border dressings with the exception of knees wrapped with Kerlix and secure with Tubigrip size F.  Wound care may be performed every other day.   3. Abrasion, right lower leg, initial encounter     Wash with baby shampoo rinse dry gently cleansed all abrasions with Vashe, applied Xeroform gauze, and foam border dressings with the exception of knees wrapped with Kerlix and secure with Tubigrip size F.  Wound care may be performed every other day.   4. Abrasion of knee, right, initial encounter      Wash with baby shampoo rinse dry gently cleansed all abrasions with Vashe, applied Xeroform gauze, and foam border dressings with the exception of knees wrapped with Kerlix and secure with Tubigrip size F.  Wound care may be performed every other day.   5. Abrasion of knee, left, initial encounter     Wash with baby shampoo rinse dry gently cleansed all abrasions with Vashe, applied Xeroform gauze, and foam border dressings with the exception of knees wrapped with Kerlix and secure with Tubigrip size F.  Wound care may be performed every other day.   6. Nausea     Prescribed Zofran.  Instructed take as directed.   7. Seizures     Instructed on medication compliance.              Follow up in about 1 week (around 5/17/2023) for Road rash.

## 2023-12-22 PROCEDURE — 96365 THER/PROPH/DIAG IV INF INIT: CPT

## 2023-12-22 PROCEDURE — 99284 EMERGENCY DEPT VISIT MOD MDM: CPT | Mod: 25

## 2023-12-22 PROCEDURE — 96375 TX/PRO/DX INJ NEW DRUG ADDON: CPT

## 2023-12-23 ENCOUNTER — HOSPITAL ENCOUNTER (EMERGENCY)
Facility: HOSPITAL | Age: 28
Discharge: HOME | End: 2023-12-23
Attending: FAMILY MEDICINE
Payer: MEDICAID

## 2023-12-23 VITALS
OXYGEN SATURATION: 99 % | HEART RATE: 91 BPM | BODY MASS INDEX: 28.12 KG/M2 | WEIGHT: 175 LBS | TEMPERATURE: 98 F | DIASTOLIC BLOOD PRESSURE: 77 MMHG | SYSTOLIC BLOOD PRESSURE: 103 MMHG | HEIGHT: 66 IN | RESPIRATION RATE: 16 BRPM

## 2023-12-23 DIAGNOSIS — G40.909 RECURRENT SEIZURES: Primary | ICD-10-CM

## 2023-12-23 LAB
ALBUMIN SERPL-MCNC: 4.1 G/DL (ref 3.5–5)
ALBUMIN/GLOB SERPL: 1.2 RATIO (ref 1.1–2)
ALP SERPL-CCNC: 35 UNIT/L (ref 40–150)
ALT SERPL-CCNC: 27 UNIT/L (ref 0–55)
APPEARANCE UR: CLEAR
AST SERPL-CCNC: 20 UNIT/L (ref 5–34)
B-HCG UR QL: NEGATIVE
BACTERIA #/AREA URNS AUTO: ABNORMAL /HPF
BASOPHILS # BLD AUTO: 0.01 X10(3)/MCL
BASOPHILS NFR BLD AUTO: 0.2 %
BILIRUB SERPL-MCNC: 0.2 MG/DL
BILIRUB UR QL STRIP.AUTO: NEGATIVE
BUN SERPL-MCNC: 3.8 MG/DL (ref 7–18.7)
CALCIUM SERPL-MCNC: 9 MG/DL (ref 8.4–10.2)
CHLORIDE SERPL-SCNC: 110 MMOL/L (ref 98–107)
CO2 SERPL-SCNC: 19 MMOL/L (ref 22–29)
COLOR UR AUTO: COLORLESS
CREAT SERPL-MCNC: 0.6 MG/DL (ref 0.55–1.02)
CTP QC/QA: YES
EOSINOPHIL # BLD AUTO: 0 X10(3)/MCL (ref 0–0.9)
EOSINOPHIL NFR BLD AUTO: 0 %
ERYTHROCYTE [DISTWIDTH] IN BLOOD BY AUTOMATED COUNT: 13.6 % (ref 11.5–17)
GFR SERPLBLD CREATININE-BSD FMLA CKD-EPI: >60 MLS/MIN/1.73/M2
GLOBULIN SER-MCNC: 3.5 GM/DL (ref 2.4–3.5)
GLUCOSE SERPL-MCNC: 85 MG/DL (ref 74–100)
GLUCOSE UR QL STRIP.AUTO: NORMAL
HCT VFR BLD AUTO: 35.2 % (ref 37–47)
HGB BLD-MCNC: 11.7 G/DL (ref 12–16)
HOLD SPECIMEN: NORMAL
HYALINE CASTS #/AREA URNS LPF: ABNORMAL /LPF
IMM GRANULOCYTES # BLD AUTO: 0.01 X10(3)/MCL (ref 0–0.04)
IMM GRANULOCYTES NFR BLD AUTO: 0.2 %
KETONES UR QL STRIP.AUTO: NEGATIVE
LEUKOCYTE ESTERASE UR QL STRIP.AUTO: NEGATIVE
LYMPHOCYTES # BLD AUTO: 1.36 X10(3)/MCL (ref 0.6–4.6)
LYMPHOCYTES NFR BLD AUTO: 26 %
MCH RBC QN AUTO: 32 PG (ref 27–31)
MCHC RBC AUTO-ENTMCNC: 33.2 G/DL (ref 33–36)
MCV RBC AUTO: 96.2 FL (ref 80–94)
MONOCYTES # BLD AUTO: 0.35 X10(3)/MCL (ref 0.1–1.3)
MONOCYTES NFR BLD AUTO: 6.7 %
NEUTROPHILS # BLD AUTO: 3.5 X10(3)/MCL (ref 2.1–9.2)
NEUTROPHILS NFR BLD AUTO: 66.9 %
NITRITE UR QL STRIP.AUTO: NEGATIVE
NRBC BLD AUTO-RTO: 0 %
PH UR STRIP.AUTO: 5.5 [PH]
PLATELET # BLD AUTO: 250 X10(3)/MCL (ref 130–400)
PMV BLD AUTO: 10.1 FL (ref 7.4–10.4)
POTASSIUM SERPL-SCNC: 3.1 MMOL/L (ref 3.5–5.1)
PROT SERPL-MCNC: 7.6 GM/DL (ref 6.4–8.3)
PROT UR QL STRIP.AUTO: NEGATIVE
RBC # BLD AUTO: 3.66 X10(6)/MCL (ref 4.2–5.4)
RBC #/AREA URNS AUTO: ABNORMAL /HPF
RBC UR QL AUTO: NEGATIVE
SODIUM SERPL-SCNC: 141 MMOL/L (ref 136–145)
SP GR UR STRIP.AUTO: 1 (ref 1–1.03)
SQUAMOUS #/AREA URNS LPF: ABNORMAL /HPF
UROBILINOGEN UR STRIP-ACNC: NORMAL
WBC # SPEC AUTO: 5.23 X10(3)/MCL (ref 4.5–11.5)
WBC #/AREA URNS AUTO: ABNORMAL /HPF

## 2023-12-23 PROCEDURE — 63600175 PHARM REV CODE 636 W HCPCS: Performed by: FAMILY MEDICINE

## 2023-12-23 PROCEDURE — 81025 URINE PREGNANCY TEST: CPT | Performed by: FAMILY MEDICINE

## 2023-12-23 PROCEDURE — 81001 URINALYSIS AUTO W/SCOPE: CPT | Performed by: FAMILY MEDICINE

## 2023-12-23 PROCEDURE — 80053 COMPREHEN METABOLIC PANEL: CPT | Performed by: FAMILY MEDICINE

## 2023-12-23 PROCEDURE — 85025 COMPLETE CBC W/AUTO DIFF WBC: CPT | Performed by: FAMILY MEDICINE

## 2023-12-23 RX ORDER — ONDANSETRON 2 MG/ML
4 INJECTION INTRAMUSCULAR; INTRAVENOUS
Status: COMPLETED | OUTPATIENT
Start: 2023-12-23 | End: 2023-12-23

## 2023-12-23 RX ORDER — LEVETIRACETAM 10 MG/ML
1000 INJECTION INTRAVASCULAR
Status: COMPLETED | OUTPATIENT
Start: 2023-12-23 | End: 2023-12-23

## 2023-12-23 RX ORDER — LEVETIRACETAM 5 MG/ML
500 INJECTION INTRAVASCULAR
Status: DISCONTINUED | OUTPATIENT
Start: 2023-12-23 | End: 2023-12-23

## 2023-12-23 RX ORDER — LORAZEPAM 2 MG/ML
2 INJECTION INTRAMUSCULAR
Status: COMPLETED | OUTPATIENT
Start: 2023-12-23 | End: 2023-12-23

## 2023-12-23 RX ADMIN — ONDANSETRON 4 MG: 2 INJECTION INTRAMUSCULAR; INTRAVENOUS at 12:12

## 2023-12-23 RX ADMIN — LORAZEPAM 2 MG: 2 INJECTION INTRAMUSCULAR; INTRAVENOUS at 12:12

## 2023-12-23 RX ADMIN — LEVETIRACETAM INJECTION 1000 MG: 10 INJECTION INTRAVENOUS at 12:12

## 2023-12-23 NOTE — ED PROVIDER NOTES
"Encounter Date: 12/22/2023       History     Chief Complaint   Patient presents with    Seizures     Mother states has sleep over with friends "so she didn't take her medications and was drinking a little".  Had witnessed seizure x 15 minutes.  EMS states continued to seize in route so was given Versed.  Presents to ED sedated from Versed.       Patient is a 28-year-old female presents emergency room for evaluation with reports of recurrent seizures.  Mother reports that the patient not take her Keppra, neither morning or evening dose.  Mother reports last seizure was in August.  Patient had attended a "pajama [place] party", and also consumed alcohol tonight.  Patient received Versed via EMS for active seizure.  Patient currently postictal.    The history is provided by the patient, the EMS personnel and a parent.     Review of patient's allergies indicates:   Allergen Reactions    Tomato Swelling     Past Medical History:   Diagnosis Date    Alopecia     Heart valve problem     Seizures     Thyroid disease      Past Surgical History:   Procedure Laterality Date    CHOLECYSTECTOMY      TONSILLECTOMY       Family History   Problem Relation Age of Onset    Hypertension Mother     Migraines Mother      Social History     Tobacco Use    Smoking status: Never    Smokeless tobacco: Never   Substance Use Topics    Alcohol use: Yes    Drug use: Never     Review of Systems   Reason unable to perform ROS: post ictal.       Physical Exam     Initial Vitals [12/23/23 0001]   BP Pulse Resp Temp SpO2   109/64 107 15 97.7 °F (36.5 °C) 95 %      MAP       --         Physical Exam    Nursing note and vitals reviewed.  Constitutional: She appears well-developed and well-nourished. No distress.   HENT:   Head: Normocephalic and atraumatic.   Mouth/Throat: Oropharynx is clear and moist.   Eyes: Conjunctivae and EOM are normal. Pupils are equal, round, and reactive to light.   Neck: Neck supple. No tracheal deviation present. No JVD " present.   Normal range of motion.  Cardiovascular:  Normal rate, regular rhythm, normal heart sounds and intact distal pulses.     Exam reveals no gallop and no friction rub.       No murmur heard.  Pulmonary/Chest: Breath sounds normal. No stridor. No respiratory distress. She has no wheezes. She has no rhonchi. She has no rales.   Abdominal: Abdomen is soft. Bowel sounds are normal. She exhibits no distension. There is no abdominal tenderness. There is no rebound and no guarding.   Musculoskeletal:         General: Normal range of motion.      Cervical back: Normal range of motion and neck supple.     Neurological: She is alert and oriented to person, place, and time.   Skin: Skin is warm and dry. Capillary refill takes less than 2 seconds. No erythema.   Psychiatric: She has a normal mood and affect. Her behavior is normal. Judgment and thought content normal.         ED Course   Procedures  Labs Reviewed   COMPREHENSIVE METABOLIC PANEL - Abnormal; Notable for the following components:       Result Value    Potassium Level 3.1 (*)     Chloride 110 (*)     Carbon Dioxide 19 (*)     Blood Urea Nitrogen 3.8 (*)     Alkaline Phosphatase 35 (*)     All other components within normal limits   URINALYSIS, REFLEX TO URINE CULTURE - Abnormal; Notable for the following components:    Color, UA Colorless (*)     Bacteria, UA Trace (*)     Squamous Epithelial Cells, UA Occ (*)     All other components within normal limits   CBC WITH DIFFERENTIAL - Abnormal; Notable for the following components:    RBC 3.66 (*)     Hgb 11.7 (*)     Hct 35.2 (*)     MCV 96.2 (*)     MCH 32.0 (*)     All other components within normal limits   CBC W/ AUTO DIFFERENTIAL    Narrative:     The following orders were created for panel order CBC Auto Differential.  Procedure                               Abnormality         Status                     ---------                               -----------         ------                     CBC with  Differential[[id number]]        Abnormal            Final result                 Please view results for these tests on the individual orders.   EXTRA TUBES    Narrative:     The following orders were created for panel order EXTRA TUBES.  Procedure                               Abnormality         Status                     ---------                               -----------         ------                     Light Blue Top Hold[[id number]]                              Final result               Red Top Hold[[id number]]                                     Final result               Gold Top Hold[[id number]]                                    Final result                 Please view results for these tests on the individual orders.   LIGHT BLUE TOP HOLD   RED TOP HOLD   GOLD TOP HOLD   POCT URINE PREGNANCY          Imaging Results              CT Head Without Contrast (Final result)  Result time 12/23/23 08:42:21      Final result by Simona Sandoval MD (12/23/23 08:42:21)                   Impression:      No acute intracranial abnormality.    No significant change from the [facility] interpretation      Electronically signed by: Simona Sandoval  Date:    12/23/2023  Time:    08:42               Narrative:    EXAMINATION:  CT HEAD WITHOUT CONTRAST    CLINICAL HISTORY:  head injury; recurrent seizure;    TECHNIQUE:  Axial scans were obtained from skull base to the vertex.    Coronal and sagittal reconstructions obtained from the axial data.    Automatic exposure control was utilized to limit radiation dose.    Contrast: None    Radiation Dose:    Total DLP: 869 mGy*cm    COMPARISON:  CT head dated 05/07/2023    FINDINGS:  There is no acute intracranial hemorrhage or edema. The gray-white matter differentiation is preserved.    There is no mass effect or midline shift. The ventricles and sulci are normal in size. The basal cisterns are patent. There is no abnormal extra-axial fluid collection.    The calvarium and  skull base are intact. The visualized paranasal sinuses and the mastoid air cells are clear.                        Preliminary result by Dudley Hernandez MD (12/23/23 02:23:25)                   Impression:    1. No acute intracranial traumatic injury identified. Details and other findings as noted above.               Narrative:    START OF REPORT:  Technique: CT of the head was performed without intravenous contrast with axial as well as coronal and sagittal images.    Comparison: None.    Dosage Information: Automated exposure control was utilized.    Clinical history: Seizures (Mother states has sleep over with friends so she didn't take her medications and was drinking a little. Had witnessed seizure x 15 minutes. EMS states continued to seize in route so was given Versed. Presents to ED sedated from Versed. ).    Findings:  Hemorrhage: No acute intracranial hemorrhage is seen.  CSF spaces: The ventricles sulci and basal cisterns are within normal limits.  Brain parenchyma: Unremarkable with preservation of the grey white junction throughout.  Cerebellum: Unremarkable.  Sella and skull base: The sella appears to be within normal limits for age.  Cerebellopontine angles: Within normal limits.  Herniation: None.  Intracranial calcifications: Incidental note is made of some pineal region calcification.  Calvarium: No acute linear or depressed skull fracture is seen.  Scalp: There is mild soft tissue swelling in the right frontal area with no underlying bone injury.    Maxillofacial Structures:  Paranasal sinuses: The visualized paranasal sinuses appear clear with no mucoperiosteal thickening or air fluid levels identified.  Orbits: The orbits appear unremarkable.  Zygomatic arches: The zygomatic arches are intact and unremarkable.  Temporal bones and mastoids: The temporal bones and mastoids appear unremarkable.  TMJ: The mandibular condyles appear normally placed with respect to the mandibular fossa.  Nasal  Bones: No displaced nasal bone fracture is seen.    Visualized upper cervical spine: The visualized cervical spine appears unremarkable.                                         Medications   levETIRAcetam in NaCl (iso-os) IVPB 1,000 mg (0 mg Intravenous Stopped 12/23/23 0059)   LORazepam injection 2 mg (2 mg Intravenous Given 12/23/23 0038)   ondansetron injection 4 mg (4 mg Intravenous Given 12/23/23 0048)     Medical Decision Making  Patient is a 28-year-old female brought to emergency room for recurrent seizures; mother reports drank alcohol, lack of sleep, missed medication.  Will obtain basic laboratory evaluation including CBC CMP.  Mother reports patient had a head injury from falling, noted to have a small area of bruising over the right forehead.  Will continue to monitor the patient, and if status has not returned to baseline, may consider obtain a CT scan for further evaluation.    Differential diagnosis:  Recurrent seizures, head injury    Amount and/or Complexity of Data Reviewed  Labs: ordered.  Radiology: ordered.    Risk  Prescription drug management.               ED Course as of 01/02/24 0535   Sat Dec 23, 2023   0235 Patient began having teeth grinding, and tremors - given ativan; symptoms resolved.  CT scan obtained while patient sedated.  No further seizure activity appreciated; no abnormality noted on CT scan.  Patient resting comfortably.  Discussed results with the patient's mother.  Patient maintaining oxygen saturation. [MW]      ED Course User Index  [MW] Bry Cote MD                           Clinical Impression:  Final diagnoses:  [G40.909] Recurrent seizures (Primary)          ED Disposition Condition    Discharge Stable          ED Prescriptions    None       Follow-up Information       Follow up With Specialties Details Why Contact Info    Sammi Mtz MD Internal Medicine   [address] [facility] [address]  [phone]      [facility] - Emergency Dept  Emergency Medicine  As needed, If symptoms worsen [address] [facility] [place] [address]  [phone]             Bry Cote MD  01/02/24 0535

## 2024-10-26 ENCOUNTER — HOSPITAL ENCOUNTER (EMERGENCY)
Facility: HOSPITAL | Age: 29
Discharge: HOME | End: 2024-10-26
Attending: INTERNAL MEDICINE
Payer: MEDICAID

## 2024-10-26 VITALS
HEART RATE: 105 BPM | DIASTOLIC BLOOD PRESSURE: 84 MMHG | WEIGHT: 174 LBS | TEMPERATURE: 99 F | SYSTOLIC BLOOD PRESSURE: 112 MMHG | RESPIRATION RATE: 18 BRPM | BODY MASS INDEX: 27.31 KG/M2 | HEIGHT: 67 IN | OXYGEN SATURATION: 99 %

## 2024-10-26 DIAGNOSIS — G40.909 SEIZURE DISORDER: Primary | ICD-10-CM

## 2024-10-26 LAB
ALBUMIN SERPL-MCNC: 4.4 G/DL (ref 3.5–5)
ALBUMIN/GLOB SERPL: 1.2 RATIO (ref 1.1–2)
ALP SERPL-CCNC: 46 UNIT/L (ref 40–150)
ALT SERPL-CCNC: 26 UNIT/L (ref 0–55)
ANION GAP SERPL CALC-SCNC: 13 MEQ/L
AST SERPL-CCNC: 25 UNIT/L (ref 5–34)
BASOPHILS # BLD AUTO: 0.01 X10(3)/MCL
BASOPHILS NFR BLD AUTO: 0.2 %
BILIRUB SERPL-MCNC: 0.3 MG/DL
BUN SERPL-MCNC: 4.2 MG/DL (ref 7–18.7)
CALCIUM SERPL-MCNC: 9.3 MG/DL (ref 8.4–10.2)
CHLORIDE SERPL-SCNC: 111 MMOL/L (ref 98–107)
CO2 SERPL-SCNC: 18 MMOL/L (ref 22–29)
CREAT SERPL-MCNC: 0.62 MG/DL (ref 0.55–1.02)
CREAT/UREA NIT SERPL: 7
EOSINOPHIL # BLD AUTO: 0 X10(3)/MCL (ref 0–0.9)
EOSINOPHIL NFR BLD AUTO: 0 %
ERYTHROCYTE [DISTWIDTH] IN BLOOD BY AUTOMATED COUNT: 12.4 % (ref 11.5–17)
GFR SERPLBLD CREATININE-BSD FMLA CKD-EPI: >60 ML/MIN/1.73/M2
GLOBULIN SER-MCNC: 3.6 GM/DL (ref 2.4–3.5)
GLUCOSE SERPL-MCNC: 105 MG/DL (ref 74–100)
HCT VFR BLD AUTO: 33.6 % (ref 37–47)
HGB BLD-MCNC: 11.7 G/DL (ref 12–16)
HOLD SPECIMEN: NORMAL
IMM GRANULOCYTES # BLD AUTO: 0.01 X10(3)/MCL (ref 0–0.04)
IMM GRANULOCYTES NFR BLD AUTO: 0.2 %
LYMPHOCYTES # BLD AUTO: 1.97 X10(3)/MCL (ref 0.6–4.6)
LYMPHOCYTES NFR BLD AUTO: 37.7 %
MCH RBC QN AUTO: 34.3 PG (ref 27–31)
MCHC RBC AUTO-ENTMCNC: 34.8 G/DL (ref 33–36)
MCV RBC AUTO: 98.5 FL (ref 80–94)
MONOCYTES # BLD AUTO: 0.37 X10(3)/MCL (ref 0.1–1.3)
MONOCYTES NFR BLD AUTO: 7.1 %
NEUTROPHILS # BLD AUTO: 2.87 X10(3)/MCL (ref 2.1–9.2)
NEUTROPHILS NFR BLD AUTO: 54.8 %
NRBC BLD AUTO-RTO: 0 %
PLATELET # BLD AUTO: 275 X10(3)/MCL (ref 130–400)
PMV BLD AUTO: 10.1 FL (ref 7.4–10.4)
POTASSIUM SERPL-SCNC: 3.6 MMOL/L (ref 3.5–5.1)
PROT SERPL-MCNC: 8 GM/DL (ref 6.4–8.3)
RBC # BLD AUTO: 3.41 X10(6)/MCL (ref 4.2–5.4)
SODIUM SERPL-SCNC: 142 MMOL/L (ref 136–145)
WBC # BLD AUTO: 5.23 X10(3)/MCL (ref 4.5–11.5)

## 2024-10-26 PROCEDURE — 85025 COMPLETE CBC W/AUTO DIFF WBC: CPT | Performed by: PHYSICIAN ASSISTANT

## 2024-10-26 PROCEDURE — 99284 EMERGENCY DEPT VISIT MOD MDM: CPT | Mod: 25

## 2024-10-26 PROCEDURE — 96365 THER/PROPH/DIAG IV INF INIT: CPT

## 2024-10-26 PROCEDURE — 80053 COMPREHEN METABOLIC PANEL: CPT | Performed by: PHYSICIAN ASSISTANT

## 2024-10-26 PROCEDURE — 63600175 PHARM REV CODE 636 W HCPCS: Performed by: PHYSICIAN ASSISTANT

## 2024-10-26 PROCEDURE — 96375 TX/PRO/DX INJ NEW DRUG ADDON: CPT

## 2024-10-26 RX ORDER — LORAZEPAM 2 MG/ML
2 INJECTION INTRAMUSCULAR
Status: COMPLETED | OUTPATIENT
Start: 2024-10-26 | End: 2024-10-26

## 2024-10-26 RX ORDER — LEVETIRACETAM 500 MG/1
500 TABLET ORAL 2 TIMES DAILY
Qty: 60 TABLET | Refills: 2 | Status: SHIPPED | OUTPATIENT
Start: 2024-10-26 | End: 2025-01-24

## 2024-10-26 RX ORDER — LEVETIRACETAM 10 MG/ML
1000 INJECTION INTRAVASCULAR
Status: COMPLETED | OUTPATIENT
Start: 2024-10-26 | End: 2024-10-26

## 2024-10-26 RX ADMIN — LEVETIRACETAM INJECTION 1000 MG: 10 INJECTION INTRAVENOUS at 01:10

## 2024-10-26 RX ADMIN — LORAZEPAM 2 MG: 2 INJECTION INTRAMUSCULAR; INTRAVENOUS at 01:10

## 2024-11-19 ENCOUNTER — PATIENT MESSAGE (OUTPATIENT)
Dept: RESEARCH | Facility: HOSPITAL | Age: 29
End: 2024-11-19
Payer: MEDICAID

## 2025-05-05 ENCOUNTER — OFFICE VISIT (OUTPATIENT)
Dept: URGENT CARE | Facility: CLINIC | Age: 30
End: 2025-05-05
Payer: MEDICAID

## 2025-05-05 VITALS
WEIGHT: 188 LBS | RESPIRATION RATE: 20 BRPM | HEART RATE: 98 BPM | HEIGHT: 67 IN | BODY MASS INDEX: 29.51 KG/M2 | DIASTOLIC BLOOD PRESSURE: 101 MMHG | TEMPERATURE: 98 F | SYSTOLIC BLOOD PRESSURE: 159 MMHG | OXYGEN SATURATION: 97 %

## 2025-05-05 DIAGNOSIS — K08.89 PAIN, DENTAL: Primary | ICD-10-CM

## 2025-05-05 DIAGNOSIS — R03.0 ELEVATED BLOOD PRESSURE READING IN OFFICE WITHOUT DIAGNOSIS OF HYPERTENSION: ICD-10-CM

## 2025-05-05 PROCEDURE — 99213 OFFICE O/P EST LOW 20 MIN: CPT | Mod: S$PBB,,,

## 2025-05-05 PROCEDURE — 99215 OFFICE O/P EST HI 40 MIN: CPT | Mod: PBBFAC

## 2025-05-05 RX ORDER — OXYCODONE AND ACETAMINOPHEN 5; 325 MG/1; MG/1
1 TABLET ORAL EVERY 8 HOURS PRN
Qty: 15 TABLET | Refills: 0 | Status: SHIPPED | OUTPATIENT
Start: 2025-05-05 | End: 2025-05-10

## 2025-05-05 RX ORDER — ONDANSETRON 4 MG/1
4 TABLET, ORALLY DISINTEGRATING ORAL EVERY 8 HOURS PRN
Qty: 15 TABLET | Refills: 0 | Status: SHIPPED | OUTPATIENT
Start: 2025-05-05 | End: 2025-05-10

## 2025-05-05 RX ORDER — ONDANSETRON 4 MG/1
4 TABLET, ORALLY DISINTEGRATING ORAL EVERY 8 HOURS PRN
Qty: 15 TABLET | Refills: 0 | Status: CANCELLED | OUTPATIENT
Start: 2025-05-05 | End: 2025-05-10

## 2025-05-05 RX ORDER — OXYCODONE AND ACETAMINOPHEN 10; 325 MG/1; MG/1
1 TABLET ORAL EVERY 4 HOURS PRN
Refills: 0 | Status: CANCELLED | OUTPATIENT
Start: 2025-05-05

## 2025-05-06 NOTE — PATIENT INSTRUCTIONS
Primary hypertension, also known as essential hypertension, is diagnosed when high blood pressure is identified in a patient without an underlying cause. The American College of Cardiology (ACC)/American Heart Association (AHA) guidelines define hypertension as a blood pressure reading of 130/80 mmHg or higher. This diagnosis is typically based on the average of two or more readings taken on separate occasion

## 2025-05-06 NOTE — PROGRESS NOTES
"Subjective:       Patient ID: Lesly Culver is a 30 y.o. female.    Vitals:  height is 5' 7" (1.702 m) and weight is 85.3 kg (188 lb). Her oral temperature is 98.1 °F (36.7 °C). Her blood pressure is 159/101 (abnormal) and her pulse is 98. Her respiration is 20 and oxygen saturation is 97%.     Chief Complaint: Dental Pain (Dental pain from chipped tooth on right side. Patient also has facial swelling and a metallic taste)    29 y/o AAF hx of seizures presents to  with mother, she is c/o right lower molar pain x 1 months, has completed abx treatment and Norco with no improvement. Reports was due for extraction, procedure delayed r/t elevated BP. She denies any hx of of HTN. Patient's last menstrual period was 04/27/2025.      Dental Pain         HENT:  Positive for dental problem and facial swelling. Negative for facial trauma.    Gastrointestinal:  Positive for nausea.   Neurological:  Positive for seizures (HX). Negative for dizziness.       Objective:      Physical Exam   Constitutional: She is oriented to person, place, and time. She is cooperative. She is easily aroused.      Comments:Appears uncomfortable.    awake  HENT:   Head: Normocephalic and atraumatic.   Mouth/Throat: Oropharynx is clear and moist and mucous membranes are normal. Abnormal dentition. Dental caries present.       Abdominal: Normal appearance.   Neurological: She is alert, oriented to person, place, and time and easily aroused. Gait normal. GCS eye subscore is 4. GCS verbal subscore is 5. GCS motor subscore is 6.   Skin: Skin is warm, dry and intact. Capillary refill takes less than 2 seconds.   Psychiatric: Her speech is normal and behavior is normal. Mood and affect normal.   Nursing note and vitals reviewed.        Assessment:       1. Pain, dental    2. Elevated blood pressure reading in office without diagnosis of hypertension          Plan:     Encouraged patient to follow up with PCP this week for 2nd blood pressure screening for " proper diagnosis, we will prescribe few days of Percocet for moderate/severe pain, encouraged to adhere to his soft diet, continue with meticulous dental hygiene.    Pain, dental  -     oxyCODONE-acetaminophen (PERCOCET) 5-325 mg per tablet; Take 1 tablet by mouth every 8 (eight) hours as needed for Pain.  Dispense: 15 tablet; Refill: 0  -     (Magic mouthwash) 1:1:1 diphenhydrAMINE(Benadryl) 12.5mg/5ml liq, aluminum & magnesium hydroxide-simethicone (Maalox), LIDOcaine viscous 2%; Swish and spit 5 mLs every 4 (four) hours as needed (gargle and spit for dental pain). for mouth sores  Dispense: 100 mL; Refill: 0  -     ondansetron (ZOFRAN-ODT) 4 MG TbDL; Take 1 tablet (4 mg total) by mouth every 8 (eight) hours as needed.  Dispense: 15 tablet; Refill: 0    Elevated blood pressure reading in office without diagnosis of hypertension

## 2025-05-21 ENCOUNTER — HOSPITAL ENCOUNTER (EMERGENCY)
Facility: HOSPITAL | Age: 30
Discharge: HOME | End: 2025-05-21
Attending: EMERGENCY MEDICINE
Payer: MEDICAID

## 2025-05-21 VITALS
SYSTOLIC BLOOD PRESSURE: 137 MMHG | OXYGEN SATURATION: 98 % | HEIGHT: 67 IN | HEART RATE: 115 BPM | BODY MASS INDEX: 28.25 KG/M2 | TEMPERATURE: 98 F | RESPIRATION RATE: 18 BRPM | DIASTOLIC BLOOD PRESSURE: 89 MMHG | WEIGHT: 180 LBS

## 2025-05-21 DIAGNOSIS — J06.9 VIRAL URI WITH COUGH: ICD-10-CM

## 2025-05-21 DIAGNOSIS — F44.5 PSYCHOGENIC NONEPILEPTIC SEIZURE: Primary | ICD-10-CM

## 2025-05-21 LAB
ALBUMIN SERPL-MCNC: 4 G/DL (ref 3.5–5)
ALBUMIN/GLOB SERPL: 0.9 RATIO (ref 1.1–2)
ALP SERPL-CCNC: 47 UNIT/L (ref 40–150)
ALT SERPL-CCNC: 27 UNIT/L (ref 0–55)
ANION GAP SERPL CALC-SCNC: 9 MEQ/L
AST SERPL-CCNC: 28 UNIT/L (ref 11–45)
B-HCG SERPL QL: NEGATIVE
BASOPHILS # BLD AUTO: 0.02 X10(3)/MCL
BASOPHILS NFR BLD AUTO: 0.2 %
BILIRUB SERPL-MCNC: 0.2 MG/DL
BUN SERPL-MCNC: 10.6 MG/DL (ref 7–18.7)
CALCIUM SERPL-MCNC: 9.3 MG/DL (ref 8.4–10.2)
CHLORIDE SERPL-SCNC: 107 MMOL/L (ref 98–107)
CO2 SERPL-SCNC: 20 MMOL/L (ref 22–29)
CREAT SERPL-MCNC: 0.69 MG/DL (ref 0.55–1.02)
CREAT/UREA NIT SERPL: 15
EOSINOPHIL # BLD AUTO: 0 X10(3)/MCL (ref 0–0.9)
EOSINOPHIL NFR BLD AUTO: 0 %
ERYTHROCYTE [DISTWIDTH] IN BLOOD BY AUTOMATED COUNT: 12.9 % (ref 11.5–17)
GFR SERPLBLD CREATININE-BSD FMLA CKD-EPI: >60 ML/MIN/1.73/M2
GLOBULIN SER-MCNC: 4.3 GM/DL (ref 2.4–3.5)
GLUCOSE SERPL-MCNC: 113 MG/DL (ref 74–100)
HCT VFR BLD AUTO: 36.3 % (ref 37–47)
HGB BLD-MCNC: 11.5 G/DL (ref 12–16)
IMM GRANULOCYTES # BLD AUTO: 0.03 X10(3)/MCL (ref 0–0.04)
IMM GRANULOCYTES NFR BLD AUTO: 0.4 %
LYMPHOCYTES # BLD AUTO: 2.05 X10(3)/MCL (ref 0.6–4.6)
LYMPHOCYTES NFR BLD AUTO: 25.5 %
MCH RBC QN AUTO: 33.8 PG (ref 27–31)
MCHC RBC AUTO-ENTMCNC: 31.7 G/DL (ref 33–36)
MCV RBC AUTO: 106.8 FL (ref 80–94)
MONOCYTES # BLD AUTO: 0.8 X10(3)/MCL (ref 0.1–1.3)
MONOCYTES NFR BLD AUTO: 10 %
NEUTROPHILS # BLD AUTO: 5.13 X10(3)/MCL (ref 2.1–9.2)
NEUTROPHILS NFR BLD AUTO: 63.9 %
NRBC BLD AUTO-RTO: 0 %
PLATELET # BLD AUTO: 319 X10(3)/MCL (ref 130–400)
PMV BLD AUTO: 9.3 FL (ref 7.4–10.4)
POTASSIUM SERPL-SCNC: 4.4 MMOL/L (ref 3.5–5.1)
PROT SERPL-MCNC: 8.3 GM/DL (ref 6.4–8.3)
RBC # BLD AUTO: 3.4 X10(6)/MCL (ref 4.2–5.4)
SODIUM SERPL-SCNC: 136 MMOL/L (ref 136–145)
WBC # BLD AUTO: 8.03 X10(3)/MCL (ref 4.5–11.5)

## 2025-05-21 PROCEDURE — 25000003 PHARM REV CODE 250: Performed by: EMERGENCY MEDICINE

## 2025-05-21 PROCEDURE — 99284 EMERGENCY DEPT VISIT MOD MDM: CPT | Mod: 25

## 2025-05-21 PROCEDURE — 84703 CHORIONIC GONADOTROPIN ASSAY: CPT | Performed by: EMERGENCY MEDICINE

## 2025-05-21 PROCEDURE — 85025 COMPLETE CBC W/AUTO DIFF WBC: CPT | Performed by: EMERGENCY MEDICINE

## 2025-05-21 PROCEDURE — 80053 COMPREHEN METABOLIC PANEL: CPT | Performed by: EMERGENCY MEDICINE

## 2025-05-21 RX ORDER — LORAZEPAM 1 MG/1
2 TABLET ORAL
Status: COMPLETED | OUTPATIENT
Start: 2025-05-21 | End: 2025-05-21

## 2025-05-21 RX ORDER — BENZONATATE 200 MG/1
200 CAPSULE ORAL 3 TIMES DAILY PRN
Qty: 30 CAPSULE | Refills: 0 | Status: SHIPPED | OUTPATIENT
Start: 2025-05-21 | End: 2025-05-31

## 2025-05-21 RX ADMIN — LORAZEPAM 2 MG: 1 TABLET ORAL at 10:05

## 2025-05-21 NOTE — ED PROVIDER NOTES
Encounter Date: 5/21/2025    SCRIBE #1 NOTE: I, Kg Greg, am scribing for, and in the presence of,  Sukumar Casarez MD. I have scribed the following portions of the note - Other sections scribed: HPI, ROS, PE.       History     Chief Complaint   Patient presents with    Seizures     Pt reports had an unwitnessed seizure. Hx seizures, on lamictal. Sat herself down and felt weak. GCS 15 on arrival, c/o lightheadedness      Patient is a 29 y/o female with a hx of seizures and thyroid disease who presents to the ED following a seizure. The patient reports she has a hx of seizures that is often triggered by increased stress and overheating. The patient states she is on lamictal and is compliant with her medications. She notes this morning she was attacked by her neighbors dog which lead to her having a panic attack which lead to onset of an unwitnessed seizure. The patient also endorses a productive cough with light and dark brown sputum production for the past month.     The history is provided by the patient and medical records. No  was used.     Review of patient's allergies indicates:   Allergen Reactions    Tomato Swelling     Past Medical History:   Diagnosis Date    Alopecia     Heart valve problem     Seizures     Thyroid disease      Past Surgical History:   Procedure Laterality Date    CHOLECYSTECTOMY      TONSILLECTOMY       Family History   Problem Relation Name Age of Onset    Hypertension Mother      Migraines Mother       Social History[1]  Review of Systems   Constitutional:  Negative for chills, fatigue and fever.   HENT:  Negative for congestion and sore throat.    Eyes:  Negative for visual disturbance.   Respiratory:  Negative for cough and shortness of breath.    Cardiovascular:  Negative for chest pain.   Gastrointestinal:  Negative for abdominal pain, diarrhea, nausea and vomiting.   Genitourinary:  Negative for dysuria.   Musculoskeletal:  Negative for myalgias.    Skin:  Negative for rash.   Neurological:  Positive for seizures. Negative for weakness, numbness and headaches.   Psychiatric/Behavioral:  The patient is nervous/anxious.        Physical Exam     Initial Vitals [05/21/25 0937]   BP Pulse Resp Temp SpO2   (!) 152/100 105 18 98.2 °F (36.8 °C) 99 %      MAP       --         Physical Exam    Nursing note and vitals reviewed.  Constitutional: She appears well-developed and well-nourished.   HENT:   Head: Normocephalic and atraumatic.   Right Ear: External ear normal.   Left Ear: External ear normal.   Eyes: Conjunctivae and EOM are normal. Pupils are equal, round, and reactive to light.   Neck: Neck supple.   Normal range of motion.  Cardiovascular:  Regular rhythm, normal heart sounds and intact distal pulses.   Tachycardia present.         Pulmonary/Chest: Breath sounds normal.   Abdominal: Abdomen is soft. Bowel sounds are normal.   Musculoskeletal:         General: Normal range of motion.      Cervical back: Normal range of motion and neck supple.     Neurological: She is alert and oriented to person, place, and time. She displays tremor. GCS score is 15. GCS eye subscore is 4. GCS verbal subscore is 5. GCS motor subscore is 6.   Skin: Skin is warm and dry. Capillary refill takes less than 2 seconds.   Psychiatric: Her behavior is normal. Judgment and thought content normal. Her mood appears anxious.         ED Course   Procedures  Labs Reviewed   COMPREHENSIVE METABOLIC PANEL - Abnormal       Result Value    Sodium 136      Potassium 4.4      Chloride 107      CO2 20 (*)     Glucose 113 (*)     Blood Urea Nitrogen 10.6      Creatinine 0.69      Calcium 9.3      Protein Total 8.3      Albumin 4.0      Globulin 4.3 (*)     Albumin/Globulin Ratio 0.9 (*)     Bilirubin Total 0.2      ALP 47      ALT 27      AST 28      eGFR >60      Anion Gap 9.0      BUN/Creatinine Ratio 15     CBC WITH DIFFERENTIAL - Abnormal    WBC 8.03      RBC 3.40 (*)     Hgb 11.5 (*)     Hct  36.3 (*)     .8 (*)     MCH 33.8 (*)     MCHC 31.7 (*)     RDW 12.9      Platelet 319      MPV 9.3      Neut % 63.9      Lymph % 25.5      Mono % 10.0      Eos % 0.0      Basophil % 0.2      Imm Grans % 0.4      Neut # 5.13      Lymph # 2.05      Mono # 0.80      Eos # 0.00      Baso # 0.02      Imm Gran # 0.03      NRBC% 0.0     HCG, SERUM, QUALITATIVE - Normal    Beta HCG Qual Negative     CBC W/ AUTO DIFFERENTIAL    Narrative:     The following orders were created for panel order CBC auto differential.  Procedure                               Abnormality         Status                     ---------                               -----------         ------                     CBC with Differential[[id number]]       Abnormal            Final result                 Please view results for these tests on the individual orders.          Imaging Results              X-Ray Chest AP Portable (Final result)  Result time 05/21/25 10:15:18      Final result by Chao Callahan MD (05/21/25 10:15:18)                   Impression:      No acute chest disease is identified.      Electronically signed by: Chao Callahan  Date:    05/21/2025  Time:    10:15               Narrative:    EXAMINATION:  XR CHEST AP PORTABLE    CLINICAL HISTORY:  cough;, .    COMPARISON:  May 6, 2023    FINDINGS:  No alveolar consolidation, effusion, or pneumothorax is seen.   The thoracic aorta is normal  cardiac silhouette, central pulmonary vessels and mediastinum are normal in size and are grossly unremarkable.   visualized osseous structures are grossly unremarkable.                                       Medications   LORazepam tablet 2 mg (2 mg Oral Given 5/21/25 1003)     Medical Decision Making  Differential diagnosis includes but is not limited to: nonepileptic seizure, anxiety attack, URI, bronchitis, reflux    Amount and/or Complexity of Data Reviewed  Labs: ordered.  Radiology: ordered.    Risk  Prescription drug  management.            Scribe Attestation:   Scribe #1: I performed the above scribed service and the documentation accurately describes the services I performed. I attest to the accuracy of the note.    Attending Attestation:           Physician Attestation for Scribe:  Physician Attestation Statement for Scribe #1: I, Sukumar Casarez MD, reviewed documentation, as scribed by Kg Garza in my presence, and it is both accurate and complete.                                    Clinical Impression:  Final diagnoses:  [F44.5] Psychogenic nonepileptic seizure (Primary)          ED Disposition Condition    Discharge Stable          ED Prescriptions    None       Follow-up Information       Follow up With Specialties Details Why Contact Info    Sammi Mtz MD Internal Medicine Schedule an appointment as soon as possible for a visit   [address] [facility] [address]  [phone]                     [1]   Social History  Tobacco Use    Smoking status: Former     Types: Vaping with nicotine    Smokeless tobacco: Never   Substance Use Topics    Alcohol use: Yes    Drug use: Never        Sukumar Casarez MD  05/21/25 1754

## 2025-07-28 ENCOUNTER — HOSPITAL ENCOUNTER (EMERGENCY)
Facility: HOSPITAL | Age: 30
Discharge: HOME | End: 2025-07-28
Attending: FAMILY MEDICINE
Payer: MEDICAID

## 2025-07-28 DIAGNOSIS — R05.9 COUGH: ICD-10-CM

## 2025-07-28 DIAGNOSIS — J40 BRONCHITIS: Primary | ICD-10-CM

## 2025-07-28 LAB
FLUAV AG UPPER RESP QL IA.RAPID: NOT DETECTED
FLUBV AG UPPER RESP QL IA.RAPID: NOT DETECTED
RSV A 5' UTR RNA NPH QL NAA+PROBE: NOT DETECTED
SARS-COV-2 RNA RESP QL NAA+PROBE: NOT DETECTED

## 2025-07-28 PROCEDURE — 87637 SARSCOV2&INF A&B&RSV AMP PRB: CPT | Performed by: FAMILY MEDICINE

## 2025-07-28 PROCEDURE — 99283 EMERGENCY DEPT VISIT LOW MDM: CPT | Mod: 25

## 2025-07-28 RX ORDER — PROMETHAZINE HYDROCHLORIDE AND DEXTROMETHORPHAN HYDROBROMIDE 6.25; 15 MG/5ML; MG/5ML
5 SYRUP ORAL
Qty: 473 ML | Refills: 0 | Status: SHIPPED | OUTPATIENT
Start: 2025-07-28

## 2025-07-29 VITALS
WEIGHT: 192.63 LBS | SYSTOLIC BLOOD PRESSURE: 153 MMHG | TEMPERATURE: 99 F | HEART RATE: 105 BPM | HEIGHT: 67 IN | OXYGEN SATURATION: 97 % | BODY MASS INDEX: 30.24 KG/M2 | RESPIRATION RATE: 20 BRPM | DIASTOLIC BLOOD PRESSURE: 97 MMHG

## 2025-07-29 NOTE — ED PROVIDER NOTES
Encounter Date: 7/28/2025       History     Chief Complaint   Patient presents with    Cough     Reports productive cough for months with a sore throat. Reports losing voice last night. Reports SOB only when lying down. LEO.      30-year-old female presents with complaints of cough for a couple of months now.  Reports that it has been persistent as of recently, and is causing her to lose her voice.  She has not taken any medication for this.  She denies fever, body aches, chills, sinus congestion, chest pain, shortness of breath, abdominal pain, nausea, vomiting, diarrhea, urinary or bowel changes, wheezing, history of lung or heart issues, any other complaints.  She reports her LMP 07/16/2025.    The history is provided by the patient.     Review of patient's allergies indicates:   Allergen Reactions    Tomato Swelling     Past Medical History:   Diagnosis Date    Alopecia     Heart valve problem     Seizures     Thyroid disease      Past Surgical History:   Procedure Laterality Date    CHOLECYSTECTOMY      TONSILLECTOMY       Family History   Problem Relation Name Age of Onset    Hypertension Mother      Migraines Mother       Social History[1]  Review of Systems   Constitutional:  Negative for fever.   HENT:  Negative for sore throat.    Respiratory:  Positive for cough. Negative for shortness of breath.    Cardiovascular:  Negative for chest pain.   Gastrointestinal:  Negative for nausea.   Genitourinary:  Negative for dysuria.   Musculoskeletal:  Negative for back pain.   Skin:  Negative for rash.   Neurological:  Negative for weakness.   Hematological:  Does not bruise/bleed easily.       Physical Exam     Initial Vitals [07/28/25 1804]   BP Pulse Resp Temp SpO2   (!) 153/97 108 20 98.6 °F (37 °C) 97 %      MAP       --         Physical Exam    Nursing note and vitals reviewed.  Constitutional: She appears well-developed and well-nourished. She is not diaphoretic. No distress.   HENT:   Head: Normocephalic and  atraumatic.   Right Ear: External ear normal.   Left Ear: External ear normal.   Nose: Nose normal. Mouth/Throat: Oropharynx is clear and moist.   Eyes: Conjunctivae and EOM are normal. Pupils are equal, round, and reactive to light. Right eye exhibits no discharge. Left eye exhibits no discharge.   Neck: Neck supple.   Normal range of motion.  Cardiovascular:  Normal rate.           Pulmonary/Chest: Effort normal and breath sounds normal. No accessory muscle usage. No tachypnea. No respiratory distress. She has no wheezes. She has no rhonchi. She has no rales.   Abdominal: Abdomen is soft. Bowel sounds are normal. She exhibits no distension and no mass. There is no abdominal tenderness.   Musculoskeletal:         General: Normal range of motion.      Cervical back: Normal range of motion and neck supple.     Neurological: She is alert and oriented to person, place, and time. No cranial nerve deficit or sensory deficit. GCS score is 15. GCS eye subscore is 4. GCS verbal subscore is 5. GCS motor subscore is 6.   Skin: Skin is warm. Capillary refill takes less than 2 seconds.   Psychiatric: She has a normal mood and affect. Thought content normal.         ED Course   Procedures  Labs Reviewed   COVID/RSV/FLU A&B PCR - Normal       Result Value    Influenza A PCR Not Detected      Influenza B PCR Not Detected      Respiratory Syncytial Virus PCR Not Detected      SARS-CoV-2 PCR Not Detected      Narrative:     The Xpert Xpress SARS-CoV-2/FLU/RSV plus is a rapid, multiplexed real-time PCR test intended for the simultaneous qualitative detection and differentiation of SARS-CoV-2, Influenza A, Influenza B, and respiratory syncytial virus (RSV) viral RNA in either nasopharyngeal swab or nasal swab specimens.                Imaging Results              X-Ray Chest PA And Lateral (Final result)  Result time 07/28/25 21:51:48      Final result by Jason Armenta MD (07/28/25 21:51:48)                   Impression:      No  acute cardiopulmonary process identified.      Electronically signed by: Jason Armenta  Date:    07/28/2025  Time:    21:51               Narrative:    EXAMINATION:  XR CHEST PA AND LATERAL    CLINICAL HISTORY:  Cough, unspecified    TECHNIQUE:  Two-view    COMPARISON:  May 21, 2025.    FINDINGS:  Cardiopericardial silhouette is within normal limits. Lungs are without dense focal or segmental consolidation, congestion, pleural effusion or pneumothorax.                        Wet Read by Arleth Mendosa MD (07/28/25 20:06:44, [facility], Emergency Medicine)    No acute abnormalities                      Wet Read by Arleth Mendosa MD (07/28/25 20:06:35, [facility], Emergency Medicine)    Acute abnormalities                                     Medications - No data to display  Medical Decision Making  Differential diagnosis:   URI   Bronchitis    Amount and/or Complexity of Data Reviewed  Labs: ordered.  Radiology: ordered and independent interpretation performed.    Risk  Prescription drug management.               ED Course as of 07/29/25 0127   Mon Jul 28, 2025   2235 Given strict ED return precautions. I have spoken with the patient and/or caregivers. I have explained the patient's condition, diagnoses and treatment plan based on the information available to me at this time. I have answered the patient's and/or caregiver's questions and addressed any concerns. The patient and/or caregivers have as good an understanding of the patient's diagnosis, condition and treatment plan as can be expected at this point. The vital signs have been stable. The patient's condition is stable and appropriate for discharge from the emergency department.      The patient will pursue further outpatient evaluation with the primary care physician or other designated or consulting physician as outlined in the discharge instructions. The patient and/or caregivers are agreeable  to this plan of care and follow-up instructions have been explained in detail. The patient and/or caregivers have received these instructions in written format and have expressed an understanding of the discharge instructions. The patient and/or caregivers are aware that any significant change in condition or worsening of symptoms should prompt an immediate return to this or the closest emergency department or a call to 911.    [DB]      ED Course User Index  [DB] Berry Arce PA-C                               Clinical Impression:  Final diagnoses:  [R05.9] Cough  [J40] Bronchitis (Primary)          ED Disposition Condition    Discharge Stable          ED Prescriptions       Medication Sig Dispense Start Date End Date Auth. Provider    promethazine-dextromethorphan (PROMETHAZINE-DM) 6.25-15 mg/5 mL Syrp Take 5 mLs by mouth every 4 to 6 hours as needed. 473 mL 7/28/2025 -- Berry Arce PA-C          Follow-up Information       Follow up With Specialties Details Why Contact Info    Sammi Mtz MD Internal Medicine Schedule an appointment as soon as possible for a visit   [address] [facility] [address]  [phone]      [facility] Emergency Medicine  If symptoms worsen [address] [facility] [address] [phone]                 Berry Arce PA-C  07/29/25 0127         [1]   Social History  Tobacco Use    Smoking status: Former     Types: Vaping with nicotine    Smokeless tobacco: Never   Substance Use Topics    Alcohol use: Yes    Drug use: Never        Berry Arce PA-C  07/29/25 0127